# Patient Record
Sex: MALE | Race: WHITE | HISPANIC OR LATINO | URBAN - METROPOLITAN AREA
[De-identification: names, ages, dates, MRNs, and addresses within clinical notes are randomized per-mention and may not be internally consistent; named-entity substitution may affect disease eponyms.]

---

## 2021-04-15 ENCOUNTER — EMERGENCY (EMERGENCY)
Facility: HOSPITAL | Age: 46
LOS: 1 days | Discharge: HOME | End: 2021-04-15
Attending: INTERNAL MEDICINE | Admitting: INTERNAL MEDICINE
Payer: MEDICAID

## 2021-04-15 VITALS
RESPIRATION RATE: 26 BRPM | HEIGHT: 68 IN | HEART RATE: 132 BPM | TEMPERATURE: 103 F | OXYGEN SATURATION: 92 % | WEIGHT: 199.96 LBS | DIASTOLIC BLOOD PRESSURE: 82 MMHG | SYSTOLIC BLOOD PRESSURE: 130 MMHG

## 2021-04-15 VITALS
DIASTOLIC BLOOD PRESSURE: 70 MMHG | HEART RATE: 80 BPM | OXYGEN SATURATION: 95 % | SYSTOLIC BLOOD PRESSURE: 115 MMHG | RESPIRATION RATE: 20 BRPM

## 2021-04-15 LAB
ALBUMIN SERPL ELPH-MCNC: 3.5 G/DL — SIGNIFICANT CHANGE UP (ref 3.3–5)
ALP SERPL-CCNC: 74 U/L — SIGNIFICANT CHANGE UP (ref 40–120)
ALT FLD-CCNC: 71 U/L — HIGH (ref 10–45)
ANION GAP SERPL CALC-SCNC: 11 MMOL/L — SIGNIFICANT CHANGE UP (ref 5–17)
APPEARANCE UR: CLEAR — SIGNIFICANT CHANGE UP
APTT BLD: 31.8 SEC — SIGNIFICANT CHANGE UP (ref 27.5–35.5)
AST SERPL-CCNC: 49 U/L — HIGH (ref 10–40)
BACTERIA # UR AUTO: ABNORMAL /HPF
BASOPHILS # BLD AUTO: 0.01 K/UL — SIGNIFICANT CHANGE UP (ref 0–0.2)
BASOPHILS NFR BLD AUTO: 0.2 % — SIGNIFICANT CHANGE UP (ref 0–2)
BILIRUB SERPL-MCNC: 0.5 MG/DL — SIGNIFICANT CHANGE UP (ref 0.2–1.2)
BILIRUB UR-MCNC: NEGATIVE — SIGNIFICANT CHANGE UP
BUN SERPL-MCNC: 12 MG/DL — SIGNIFICANT CHANGE UP (ref 7–23)
CALCIUM SERPL-MCNC: 8.9 MG/DL — SIGNIFICANT CHANGE UP (ref 8.4–10.5)
CHLORIDE SERPL-SCNC: 99 MMOL/L — SIGNIFICANT CHANGE UP (ref 96–108)
CO2 SERPL-SCNC: 25 MMOL/L — SIGNIFICANT CHANGE UP (ref 22–31)
COLOR SPEC: YELLOW — SIGNIFICANT CHANGE UP
CREAT SERPL-MCNC: 1.19 MG/DL — SIGNIFICANT CHANGE UP (ref 0.5–1.3)
CRP SERPL-MCNC: 57 MG/L — HIGH
DIFF PNL FLD: ABNORMAL
EOSINOPHIL # BLD AUTO: 0 K/UL — SIGNIFICANT CHANGE UP (ref 0–0.5)
EOSINOPHIL NFR BLD AUTO: 0 % — SIGNIFICANT CHANGE UP (ref 0–6)
EPI CELLS # UR: SIGNIFICANT CHANGE UP
FERRITIN SERPL-MCNC: 1217 NG/ML — HIGH (ref 30–400)
GLUCOSE SERPL-MCNC: 150 MG/DL — HIGH (ref 70–99)
GLUCOSE UR QL: NEGATIVE — SIGNIFICANT CHANGE UP
HCT VFR BLD CALC: 46.9 % — SIGNIFICANT CHANGE UP (ref 39–50)
HGB BLD-MCNC: 16.5 G/DL — SIGNIFICANT CHANGE UP (ref 13–17)
IMM GRANULOCYTES NFR BLD AUTO: 0.3 % — SIGNIFICANT CHANGE UP (ref 0–1.5)
INR BLD: 1.24 RATIO — HIGH (ref 0.88–1.16)
KETONES UR-MCNC: NEGATIVE — SIGNIFICANT CHANGE UP
LACTATE SERPL-SCNC: 1 MMOL/L — SIGNIFICANT CHANGE UP (ref 0.7–2)
LEUKOCYTE ESTERASE UR-ACNC: NEGATIVE — SIGNIFICANT CHANGE UP
LYMPHOCYTES # BLD AUTO: 0.8 K/UL — LOW (ref 1–3.3)
LYMPHOCYTES # BLD AUTO: 13.5 % — SIGNIFICANT CHANGE UP (ref 13–44)
MCHC RBC-ENTMCNC: 29.9 PG — SIGNIFICANT CHANGE UP (ref 27–34)
MCHC RBC-ENTMCNC: 35.2 GM/DL — SIGNIFICANT CHANGE UP (ref 32–36)
MCV RBC AUTO: 85.1 FL — SIGNIFICANT CHANGE UP (ref 80–100)
MONOCYTES # BLD AUTO: 0.35 K/UL — SIGNIFICANT CHANGE UP (ref 0–0.9)
MONOCYTES NFR BLD AUTO: 5.9 % — SIGNIFICANT CHANGE UP (ref 2–14)
NEUTROPHILS # BLD AUTO: 4.74 K/UL — SIGNIFICANT CHANGE UP (ref 1.8–7.4)
NEUTROPHILS NFR BLD AUTO: 80.1 % — HIGH (ref 43–77)
NITRITE UR-MCNC: NEGATIVE — SIGNIFICANT CHANGE UP
NRBC # BLD: 0 /100 WBCS — SIGNIFICANT CHANGE UP (ref 0–0)
NT-PROBNP SERPL-SCNC: 72 PG/ML — SIGNIFICANT CHANGE UP (ref 0–300)
PH UR: 6 — SIGNIFICANT CHANGE UP (ref 5–8)
PLATELET # BLD AUTO: 173 K/UL — SIGNIFICANT CHANGE UP (ref 150–400)
POTASSIUM SERPL-MCNC: 4.1 MMOL/L — SIGNIFICANT CHANGE UP (ref 3.5–5.3)
POTASSIUM SERPL-SCNC: 4.1 MMOL/L — SIGNIFICANT CHANGE UP (ref 3.5–5.3)
PROCALCITONIN SERPL-MCNC: 0.13 NG/ML — HIGH
PROT SERPL-MCNC: 7.9 G/DL — SIGNIFICANT CHANGE UP (ref 6–8.3)
PROT UR-MCNC: 15
PROTHROM AB SERPL-ACNC: 14.9 SEC — HIGH (ref 10.6–13.6)
RBC # BLD: 5.51 M/UL — SIGNIFICANT CHANGE UP (ref 4.2–5.8)
RBC # FLD: 12.2 % — SIGNIFICANT CHANGE UP (ref 10.3–14.5)
RBC CASTS # UR COMP ASSIST: SIGNIFICANT CHANGE UP /HPF (ref 0–4)
SARS-COV-2 RNA SPEC QL NAA+PROBE: DETECTED
SODIUM SERPL-SCNC: 135 MMOL/L — SIGNIFICANT CHANGE UP (ref 135–145)
SP GR SPEC: 1.02 — SIGNIFICANT CHANGE UP (ref 1.01–1.02)
TROPONIN I SERPL-MCNC: <.017 NG/ML — LOW (ref 0.02–0.06)
UROBILINOGEN FLD QL: NEGATIVE — SIGNIFICANT CHANGE UP
WBC # BLD: 5.92 K/UL — SIGNIFICANT CHANGE UP (ref 3.8–10.5)
WBC # FLD AUTO: 5.92 K/UL — SIGNIFICANT CHANGE UP (ref 3.8–10.5)
WBC UR QL: SIGNIFICANT CHANGE UP /HPF (ref 0–5)

## 2021-04-15 PROCEDURE — 74177 CT ABD & PELVIS W/CONTRAST: CPT | Mod: 26,MA

## 2021-04-15 PROCEDURE — 87635 SARS-COV-2 COVID-19 AMP PRB: CPT

## 2021-04-15 PROCEDURE — 82728 ASSAY OF FERRITIN: CPT

## 2021-04-15 PROCEDURE — 81001 URINALYSIS AUTO W/SCOPE: CPT

## 2021-04-15 PROCEDURE — 84145 PROCALCITONIN (PCT): CPT

## 2021-04-15 PROCEDURE — 99284 EMERGENCY DEPT VISIT MOD MDM: CPT | Mod: 25

## 2021-04-15 PROCEDURE — 84484 ASSAY OF TROPONIN QUANT: CPT

## 2021-04-15 PROCEDURE — 74177 CT ABD & PELVIS W/CONTRAST: CPT

## 2021-04-15 PROCEDURE — 85730 THROMBOPLASTIN TIME PARTIAL: CPT

## 2021-04-15 PROCEDURE — 93005 ELECTROCARDIOGRAM TRACING: CPT

## 2021-04-15 PROCEDURE — 83880 ASSAY OF NATRIURETIC PEPTIDE: CPT

## 2021-04-15 PROCEDURE — 87086 URINE CULTURE/COLONY COUNT: CPT

## 2021-04-15 PROCEDURE — 94640 AIRWAY INHALATION TREATMENT: CPT

## 2021-04-15 PROCEDURE — 85025 COMPLETE CBC W/AUTO DIFF WBC: CPT

## 2021-04-15 PROCEDURE — 96365 THER/PROPH/DIAG IV INF INIT: CPT | Mod: XU

## 2021-04-15 PROCEDURE — 96367 TX/PROPH/DG ADDL SEQ IV INF: CPT

## 2021-04-15 PROCEDURE — 87040 BLOOD CULTURE FOR BACTERIA: CPT

## 2021-04-15 PROCEDURE — 85610 PROTHROMBIN TIME: CPT

## 2021-04-15 PROCEDURE — 83605 ASSAY OF LACTIC ACID: CPT

## 2021-04-15 PROCEDURE — 80053 COMPREHEN METABOLIC PANEL: CPT

## 2021-04-15 PROCEDURE — 36415 COLL VENOUS BLD VENIPUNCTURE: CPT

## 2021-04-15 PROCEDURE — 71045 X-RAY EXAM CHEST 1 VIEW: CPT

## 2021-04-15 PROCEDURE — 99285 EMERGENCY DEPT VISIT HI MDM: CPT

## 2021-04-15 PROCEDURE — 71045 X-RAY EXAM CHEST 1 VIEW: CPT | Mod: 26

## 2021-04-15 PROCEDURE — 93010 ELECTROCARDIOGRAM REPORT: CPT

## 2021-04-15 PROCEDURE — 96375 TX/PRO/DX INJ NEW DRUG ADDON: CPT

## 2021-04-15 PROCEDURE — 86140 C-REACTIVE PROTEIN: CPT

## 2021-04-15 RX ORDER — IPRATROPIUM BROMIDE 0.2 MG/ML
1 SOLUTION, NON-ORAL INHALATION ONCE
Refills: 0 | Status: COMPLETED | OUTPATIENT
Start: 2021-04-15 | End: 2021-04-15

## 2021-04-15 RX ORDER — KETOROLAC TROMETHAMINE 30 MG/ML
30 SYRINGE (ML) INJECTION ONCE
Refills: 0 | Status: DISCONTINUED | OUTPATIENT
Start: 2021-04-15 | End: 2021-04-15

## 2021-04-15 RX ORDER — ACETAMINOPHEN 500 MG
1 TABLET ORAL
Qty: 30 | Refills: 0
Start: 2021-04-15 | End: 2021-04-24

## 2021-04-15 RX ORDER — DEXAMETHASONE 0.5 MG/5ML
6 ELIXIR ORAL ONCE
Refills: 0 | Status: COMPLETED | OUTPATIENT
Start: 2021-04-15 | End: 2021-04-15

## 2021-04-15 RX ORDER — AZITHROMYCIN 500 MG/1
500 TABLET, FILM COATED ORAL ONCE
Refills: 0 | Status: COMPLETED | OUTPATIENT
Start: 2021-04-15 | End: 2021-04-15

## 2021-04-15 RX ORDER — GUAIFENESIN/DEXTROMETHORPHAN 600MG-30MG
10 TABLET, EXTENDED RELEASE 12 HR ORAL ONCE
Refills: 0 | Status: COMPLETED | OUTPATIENT
Start: 2021-04-15 | End: 2021-04-15

## 2021-04-15 RX ORDER — SODIUM CHLORIDE 9 MG/ML
1000 INJECTION INTRAMUSCULAR; INTRAVENOUS; SUBCUTANEOUS ONCE
Refills: 0 | Status: COMPLETED | OUTPATIENT
Start: 2021-04-15 | End: 2021-04-15

## 2021-04-15 RX ORDER — ALBUTEROL 90 UG/1
2 AEROSOL, METERED ORAL
Qty: 1 | Refills: 0
Start: 2021-04-15 | End: 2021-05-14

## 2021-04-15 RX ORDER — AZITHROMYCIN 500 MG/1
1 TABLET, FILM COATED ORAL
Qty: 4 | Refills: 0
Start: 2021-04-15 | End: 2021-04-18

## 2021-04-15 RX ORDER — ACETAMINOPHEN 500 MG
975 TABLET ORAL ONCE
Refills: 0 | Status: COMPLETED | OUTPATIENT
Start: 2021-04-15 | End: 2021-04-15

## 2021-04-15 RX ORDER — ALBUTEROL 90 UG/1
2 AEROSOL, METERED ORAL ONCE
Refills: 0 | Status: COMPLETED | OUTPATIENT
Start: 2021-04-15 | End: 2021-04-15

## 2021-04-15 RX ORDER — CEFTRIAXONE 500 MG/1
1000 INJECTION, POWDER, FOR SOLUTION INTRAMUSCULAR; INTRAVENOUS ONCE
Refills: 0 | Status: COMPLETED | OUTPATIENT
Start: 2021-04-15 | End: 2021-04-15

## 2021-04-15 RX ADMIN — ALBUTEROL 2 PUFF(S): 90 AEROSOL, METERED ORAL at 08:58

## 2021-04-15 RX ADMIN — Medication 1 PUFF(S): at 08:58

## 2021-04-15 RX ADMIN — Medication 30 MILLIGRAM(S): at 13:00

## 2021-04-15 RX ADMIN — CEFTRIAXONE 100 MILLIGRAM(S): 500 INJECTION, POWDER, FOR SOLUTION INTRAMUSCULAR; INTRAVENOUS at 08:49

## 2021-04-15 RX ADMIN — AZITHROMYCIN 255 MILLIGRAM(S): 500 TABLET, FILM COATED ORAL at 09:20

## 2021-04-15 RX ADMIN — SODIUM CHLORIDE 1000 MILLILITER(S): 9 INJECTION INTRAMUSCULAR; INTRAVENOUS; SUBCUTANEOUS at 08:40

## 2021-04-15 RX ADMIN — Medication 30 MILLIGRAM(S): at 11:31

## 2021-04-15 RX ADMIN — Medication 6 MILLIGRAM(S): at 15:02

## 2021-04-15 RX ADMIN — Medication 975 MILLIGRAM(S): at 08:49

## 2021-04-15 RX ADMIN — Medication 10 MILLILITER(S): at 08:49

## 2021-04-15 RX ADMIN — CEFTRIAXONE 1000 MILLIGRAM(S): 500 INJECTION, POWDER, FOR SOLUTION INTRAMUSCULAR; INTRAVENOUS at 09:20

## 2021-04-15 RX ADMIN — Medication 975 MILLIGRAM(S): at 10:10

## 2021-04-15 RX ADMIN — AZITHROMYCIN 500 MILLIGRAM(S): 500 TABLET, FILM COATED ORAL at 10:09

## 2021-04-15 RX ADMIN — SODIUM CHLORIDE 1000 MILLILITER(S): 9 INJECTION INTRAMUSCULAR; INTRAVENOUS; SUBCUTANEOUS at 10:00

## 2021-04-15 NOTE — ED PROVIDER NOTE - OBJECTIVE STATEMENT
cough fever 4 days was tested positive for covid 1 week ago   onset gradual   locations general   duration 4 days   characteristics fever, cough , body aches headache , chest pain when he coughs   context trsted positive for covid 1 week ago   aggravating factors none  relieving factors otc meds  timming off and on   severity moderate

## 2021-04-15 NOTE — ED ADULT NURSE NOTE - NSIMPLEMENTINTERV_GEN_ALL_ED
Implemented All Universal Safety Interventions:  [place] to call system. Call bell, personal items and telephone within reach. Instruct patient to call for assistance. Room bathroom lighting operational. Non-slip footwear when patient is off stretcher. Physically safe environment: no spills, clutter or unnecessary equipment. Stretcher in lowest position, wheels locked, appropriate side rails in place.

## 2021-04-15 NOTE — ED PROVIDER NOTE - CLINICAL SUMMARY MEDICAL DECISION MAKING FREE TEXT BOX
cough fever 4 days was tested positive for covid 1 week ago   onset gradual   locations general   duration 4 days   characteristics fever, cough , body aches headache , chest pain when he coughs   context trsted positive for covid 1 week ago   aggravating factors none  relieving factors otc meds  timming off and on   severity moderate  cxr bilateral infiltrates  ekg sinus tach labs procalcitonin elevated   walking o2 sat os 92 %

## 2021-04-15 NOTE — ED ADULT TRIAGE NOTE - TEMPERATURE IN FAHRENHEIT (DEGREES F)
[General Appearance - Alert] : alert [General Appearance - In No Acute Distress] : in no acute distress [Sclera] : the sclera and conjunctiva were normal [PERRL With Normal Accommodation] : pupils were equal in size, round, and reactive to light [Extraocular Movements] : extraocular movements were intact [Neck Appearance] : the appearance of the neck was normal [Neck Cervical Mass (___cm)] : no neck mass was observed [Jugular Venous Distention Increased] : there was no jugular-venous distention [Thyroid Diffuse Enlargement] : the thyroid was not enlarged [Thyroid Nodule] : there were no palpable thyroid nodules [Auscultation Breath Sounds / Voice Sounds] : lungs were clear to auscultation bilaterally [Heart Rate And Rhythm] : heart rate was normal and rhythm regular [Heart Sounds] : normal S1 and S2 [Heart Sounds Gallop] : no gallops [Heart Sounds Pericardial Friction Rub] : no pericardial rub [Bowel Sounds] : normal bowel sounds [Abdomen Soft] : soft [Abdomen Tenderness] : non-tender [Abdomen Mass (___ Cm)] : no abdominal mass palpated [Cervical Lymph Nodes Enlarged Posterior Bilaterally] : posterior cervical [Cervical Lymph Nodes Enlarged Anterior Bilaterally] : anterior cervical [Supraclavicular Lymph Nodes Enlarged Bilaterally] : supraclavicular [Axillary Lymph Nodes Enlarged Bilaterally] : axillary [Femoral Lymph Nodes Enlarged Bilaterally] : femoral 102.8 [Inguinal Lymph Nodes Enlarged Bilaterally] : inguinal [Abnormal Walk] : normal gait [Nail Clubbing] : no clubbing  or cyanosis of the fingernails [Musculoskeletal - Swelling] : no joint swelling seen [Motor Tone] : muscle strength and tone were normal [Skin Color & Pigmentation] : normal skin color and pigmentation [Skin Turgor] : normal skin turgor [] : no rash [Oriented To Time, Place, And Person] : oriented to person, place, and time [Impaired Insight] : insight and judgment were intact [Affect] : the affect was normal [FreeTextEntry1] : systolic murmur

## 2021-04-15 NOTE — ED ADULT NURSE NOTE - OBJECTIVE STATEMENT
tested positive 1+ week ago, started with fever and cough for 3 days and increased shortness of breath. Skine warm dry color pink, no edema, bilateral breath sounds, cough. Abdomen soft bowel sounds present, tender on left quadrants, no diarrhea

## 2021-04-15 NOTE — ED PROVIDER NOTE - PATIENT PORTAL LINK FT
You can access the FollowMyHealth Patient Portal offered by [facility] by registering at the following website: [email or web address]. By joining [facility]’s FollowMyHealth portal, you will also be able to view your health information using other applications (apps) compatible with our system.

## 2021-04-16 ENCOUNTER — INPATIENT (INPATIENT)
Facility: HOSPITAL | Age: 46
LOS: 7 days | Discharge: HOME | DRG: 177 | End: 2021-04-24
Attending: INTERNAL MEDICINE | Admitting: INTERNAL MEDICINE
Payer: MEDICAID

## 2021-04-16 ENCOUNTER — TRANSCRIPTION ENCOUNTER (OUTPATIENT)
Age: 46
End: 2021-04-16

## 2021-04-16 VITALS
DIASTOLIC BLOOD PRESSURE: 84 MMHG | HEART RATE: 97 BPM | RESPIRATION RATE: 16 BRPM | OXYGEN SATURATION: 84 % | SYSTOLIC BLOOD PRESSURE: 127 MMHG | TEMPERATURE: 97 F | HEIGHT: 68 IN | WEIGHT: 190.04 LBS

## 2021-04-16 DIAGNOSIS — U07.1 COVID-19: ICD-10-CM

## 2021-04-16 LAB
ALBUMIN SERPL ELPH-MCNC: 3.1 G/DL — LOW (ref 3.3–5)
ALP SERPL-CCNC: 73 U/L — SIGNIFICANT CHANGE UP (ref 40–120)
ALT FLD-CCNC: 83 U/L — HIGH (ref 10–45)
ANION GAP SERPL CALC-SCNC: 5 MMOL/L — SIGNIFICANT CHANGE UP (ref 5–17)
APTT BLD: 28.2 SEC — SIGNIFICANT CHANGE UP (ref 27.5–35.5)
AST SERPL-CCNC: 53 U/L — HIGH (ref 10–40)
BASOPHILS # BLD AUTO: 0.01 K/UL — SIGNIFICANT CHANGE UP (ref 0–0.2)
BASOPHILS NFR BLD AUTO: 0.1 % — SIGNIFICANT CHANGE UP (ref 0–2)
BILIRUB SERPL-MCNC: 0.6 MG/DL — SIGNIFICANT CHANGE UP (ref 0.2–1.2)
BUN SERPL-MCNC: 12 MG/DL — SIGNIFICANT CHANGE UP (ref 7–23)
CALCIUM SERPL-MCNC: 9 MG/DL — SIGNIFICANT CHANGE UP (ref 8.4–10.5)
CHLORIDE SERPL-SCNC: 98 MMOL/L — SIGNIFICANT CHANGE UP (ref 96–108)
CO2 SERPL-SCNC: 29 MMOL/L — SIGNIFICANT CHANGE UP (ref 22–31)
CREAT SERPL-MCNC: 1.09 MG/DL — SIGNIFICANT CHANGE UP (ref 0.5–1.3)
CULTURE RESULTS: SIGNIFICANT CHANGE UP
D DIMER BLD IA.RAPID-MCNC: 412 NG/ML DDU — HIGH
EOSINOPHIL # BLD AUTO: 0 K/UL — SIGNIFICANT CHANGE UP (ref 0–0.5)
EOSINOPHIL NFR BLD AUTO: 0 % — SIGNIFICANT CHANGE UP (ref 0–6)
GLUCOSE SERPL-MCNC: 130 MG/DL — HIGH (ref 70–99)
HCT VFR BLD CALC: 44.6 % — SIGNIFICANT CHANGE UP (ref 39–50)
HGB BLD-MCNC: 15.6 G/DL — SIGNIFICANT CHANGE UP (ref 13–17)
IMM GRANULOCYTES NFR BLD AUTO: 0.3 % — SIGNIFICANT CHANGE UP (ref 0–1.5)
INR BLD: 1.23 RATIO — HIGH (ref 0.88–1.16)
LACTATE SERPL-SCNC: 1.5 MMOL/L — SIGNIFICANT CHANGE UP (ref 0.7–2)
LYMPHOCYTES # BLD AUTO: 1.31 K/UL — SIGNIFICANT CHANGE UP (ref 1–3.3)
LYMPHOCYTES # BLD AUTO: 19.2 % — SIGNIFICANT CHANGE UP (ref 13–44)
MCHC RBC-ENTMCNC: 29.9 PG — SIGNIFICANT CHANGE UP (ref 27–34)
MCHC RBC-ENTMCNC: 35 GM/DL — SIGNIFICANT CHANGE UP (ref 32–36)
MCV RBC AUTO: 85.4 FL — SIGNIFICANT CHANGE UP (ref 80–100)
MONOCYTES # BLD AUTO: 0.18 K/UL — SIGNIFICANT CHANGE UP (ref 0–0.9)
MONOCYTES NFR BLD AUTO: 2.6 % — SIGNIFICANT CHANGE UP (ref 2–14)
NEUTROPHILS # BLD AUTO: 5.32 K/UL — SIGNIFICANT CHANGE UP (ref 1.8–7.4)
NEUTROPHILS NFR BLD AUTO: 77.8 % — HIGH (ref 43–77)
NRBC # BLD: 0 /100 WBCS — SIGNIFICANT CHANGE UP (ref 0–0)
PLATELET # BLD AUTO: 166 K/UL — SIGNIFICANT CHANGE UP (ref 150–400)
POTASSIUM SERPL-MCNC: 3.9 MMOL/L — SIGNIFICANT CHANGE UP (ref 3.5–5.3)
POTASSIUM SERPL-SCNC: 3.9 MMOL/L — SIGNIFICANT CHANGE UP (ref 3.5–5.3)
PROCALCITONIN SERPL-MCNC: 0.12 NG/ML — HIGH
PROT SERPL-MCNC: 7.3 G/DL — SIGNIFICANT CHANGE UP (ref 6–8.3)
PROTHROM AB SERPL-ACNC: 14.7 SEC — HIGH (ref 10.6–13.6)
RBC # BLD: 5.22 M/UL — SIGNIFICANT CHANGE UP (ref 4.2–5.8)
RBC # FLD: 12.3 % — SIGNIFICANT CHANGE UP (ref 10.3–14.5)
SODIUM SERPL-SCNC: 132 MMOL/L — LOW (ref 135–145)
SPECIMEN SOURCE: SIGNIFICANT CHANGE UP
WBC # BLD: 6.84 K/UL — SIGNIFICANT CHANGE UP (ref 3.8–10.5)
WBC # FLD AUTO: 6.84 K/UL — SIGNIFICANT CHANGE UP (ref 3.8–10.5)

## 2021-04-16 PROCEDURE — 93010 ELECTROCARDIOGRAM REPORT: CPT

## 2021-04-16 PROCEDURE — 99285 EMERGENCY DEPT VISIT HI MDM: CPT

## 2021-04-16 PROCEDURE — 99223 1ST HOSP IP/OBS HIGH 75: CPT

## 2021-04-16 PROCEDURE — 71045 X-RAY EXAM CHEST 1 VIEW: CPT | Mod: 26

## 2021-04-16 RX ORDER — FAMOTIDINE 10 MG/ML
20 INJECTION INTRAVENOUS ONCE
Refills: 0 | Status: COMPLETED | OUTPATIENT
Start: 2021-04-16 | End: 2021-04-16

## 2021-04-16 RX ORDER — REMDESIVIR 5 MG/ML
INJECTION INTRAVENOUS
Refills: 0 | Status: COMPLETED | OUTPATIENT
Start: 2021-04-16 | End: 2021-04-20

## 2021-04-16 RX ORDER — CHOLECALCIFEROL (VITAMIN D3) 125 MCG
2000 CAPSULE ORAL DAILY
Refills: 0 | Status: DISCONTINUED | OUTPATIENT
Start: 2021-04-16 | End: 2021-04-18

## 2021-04-16 RX ORDER — ENOXAPARIN SODIUM 100 MG/ML
40 INJECTION SUBCUTANEOUS DAILY
Refills: 0 | Status: DISCONTINUED | OUTPATIENT
Start: 2021-04-16 | End: 2021-04-18

## 2021-04-16 RX ORDER — ASCORBIC ACID 60 MG
500 TABLET,CHEWABLE ORAL
Refills: 0 | Status: DISCONTINUED | OUTPATIENT
Start: 2021-04-16 | End: 2021-04-18

## 2021-04-16 RX ORDER — REMDESIVIR 5 MG/ML
200 INJECTION INTRAVENOUS EVERY 24 HOURS
Refills: 0 | Status: COMPLETED | OUTPATIENT
Start: 2021-04-16 | End: 2021-04-16

## 2021-04-16 RX ORDER — ACETAMINOPHEN 500 MG
650 TABLET ORAL EVERY 6 HOURS
Refills: 0 | Status: DISCONTINUED | OUTPATIENT
Start: 2021-04-16 | End: 2021-04-24

## 2021-04-16 RX ORDER — REMDESIVIR 5 MG/ML
100 INJECTION INTRAVENOUS EVERY 24 HOURS
Refills: 0 | Status: COMPLETED | OUTPATIENT
Start: 2021-04-17 | End: 2021-04-20

## 2021-04-16 RX ORDER — FAMOTIDINE 10 MG/ML
20 INJECTION INTRAVENOUS DAILY
Refills: 0 | Status: DISCONTINUED | OUTPATIENT
Start: 2021-04-16 | End: 2021-04-24

## 2021-04-16 RX ORDER — ONDANSETRON 8 MG/1
4 TABLET, FILM COATED ORAL EVERY 8 HOURS
Refills: 0 | Status: DISCONTINUED | OUTPATIENT
Start: 2021-04-16 | End: 2021-04-24

## 2021-04-16 RX ORDER — ZINC SULFATE TAB 220 MG (50 MG ZINC EQUIVALENT) 220 (50 ZN) MG
220 TAB ORAL DAILY
Refills: 0 | Status: DISCONTINUED | OUTPATIENT
Start: 2021-04-16 | End: 2021-04-24

## 2021-04-16 RX ORDER — ALBUTEROL 90 UG/1
2 AEROSOL, METERED ORAL EVERY 6 HOURS
Refills: 0 | Status: DISCONTINUED | OUTPATIENT
Start: 2021-04-16 | End: 2021-04-24

## 2021-04-16 RX ORDER — DEXAMETHASONE 0.5 MG/5ML
6 ELIXIR ORAL DAILY
Refills: 0 | Status: DISCONTINUED | OUTPATIENT
Start: 2021-04-16 | End: 2021-04-24

## 2021-04-16 RX ORDER — FAMOTIDINE 10 MG/ML
20 INJECTION INTRAVENOUS ONCE
Refills: 0 | Status: DISCONTINUED | OUTPATIENT
Start: 2021-04-16 | End: 2021-04-16

## 2021-04-16 NOTE — ED PROVIDER NOTE - OBJECTIVE STATEMENT
pt c/o difficult breathing, moderate, today, worse with exertion, assoc c low SpO2, 83% at home today. pt had home covid exposure, got tested 8 day ago, covid +. started getting sxs 4 d ago with fever, chills, coughing, bodyaches.

## 2021-04-16 NOTE — ED PROVIDER NOTE - CLINICAL SUMMARY MEDICAL DECISION MAKING FREE TEXT BOX
46 yo M c COVID sxs x 4 days with sob, CASTELLANOS, hypoxia, improved on supplemental NC o2. c/w covid pna.  will need admission for further care, o2.

## 2021-04-16 NOTE — H&P ADULT - NSHPREVIEWOFSYSTEMS_GEN_ALL_CORE
CONSTITUTIONAL: ++ fever, weight loss, ++ fatigue  EYES: No eye pain, visual disturbances, or discharge  ENMT:  No difficulty hearing, tinnitus, vertigo; No sinus or throat pain  NECK: No pain or stiffness  RESPIRATORY: ++ cough, no wheezing, chills or hemoptysis; ++shortness of breath  CARDIOVASCULAR: No chest pain, palpitations, dizziness, or leg swelling  GASTROINTESTINAL: + abdominal  pain. No nausea, vomiting, or hematemesis; No diarrhea or constipation. No melena or hematochezia.  GENITOURINARY: No dysuria, frequency, hematuria, or incontinence  NEUROLOGICAL: No headaches, memory loss, loss of strength, numbness, or tremors  SKIN: No itching, burning, rashes, or lesions   LYMPH NODES: No enlarged glands  ENDOCRINE: No heat or cold intolerance; No hair loss  MUSCULOSKELETAL: No joint pain or swelling; No muscle, back, or extremity pain  PSYCHIATRIC: No depression, anxiety, mood swings, or difficulty sleeping  HEME/LYMPH: No easy bruising, or bleeding gums  ALLERY AND IMMUNOLOGIC: No hives or eczema    IMPROVE VTE Individual Risk Assessment          RISK                                                          Points  [  ] Previous VTE                                                3  [  ] Thrombophilia                                             2  [  ] Lower limb paralysis                                   2        (unable to hold up >15 seconds)    [  ] Current Cancer                                             2         (within 6 months)  [  ] Immobilization > 24 hrs                              1  [  ] ICU/CCU stay > 24 hours                             1  [  ] Age > 60                                                         1    IMPROVE VTE Score:         [   0      ]    Total Risk Factor Score:    0 - 1:   Consider IPC  >2 - 3:  Thromboprophylaxis required (enoxaparin or SQ heparin)        >4:   High Risk: Thromboprophylaxis required (enoxaparin or SQ heparin), optional add IPC  **If CONTRAINDICATION to enoxaparin or SQ heparin, USE IPCs**

## 2021-04-16 NOTE — H&P ADULT - ASSESSMENT
45M no sigPMHx other than HLD not on meds pw acute respiratory failure with hypoxia sec COVID 19 pna.    #COVID 19 pna with hypoxia  cont supplemental oxygen.   continuous pulse ox  proning as tolerated.   Start IV Decadron and IV Remdesivir.   Alb inhalers  elevated ferritin to 1217 and elev CRP 57. follow inflammatory markers.   DVT proph  GI proph with pepcid.

## 2021-04-16 NOTE — H&P ADULT - HISTORY OF PRESENT ILLNESS
45M no sigPMHx other than HLD not on meds pw COVID 19 pna. Pt tested positive for COVID 4/8/21 after several family members came down with COVID. Became symptomatic about 4 days ago with fevers, chills, myalgia, abd pain, cough and SOB. Denied vomiting, diarrhea, chest pain, leg swelling, loss of taste or smell. Came to ED yesterday for evaluation and had CTAP neg for abd pathology but +bl GGO cw COVID. Pt was D/C on azithromycin, Albuterol inhaler with no improvement and came to ED for decreased sat at home to 83%.  In ED afebrile P:97 BP: 127/84 sat 84% on RA now sat 96% on 2L NC.     Son Kunal Dubose at bedside [phone]

## 2021-04-16 NOTE — H&P ADULT - NSHPPHYSICALEXAM_GEN_ALL_CORE
Vital Signs Last 24 Hrs  T(C): 36.2 (16 Apr 2021 20:46), Max: 36.2 (16 Apr 2021 20:46)  T(F): 97.2 (16 Apr 2021 20:46), Max: 97.2 (16 Apr 2021 20:46)  HR: 90 (16 Apr 2021 23:08) (90 - 97)  BP: 117/77 (16 Apr 2021 23:08) (116/74 - 127/84)  BP(mean): 87 (16 Apr 2021 23:08) (83 - 87)  RR: 17 (16 Apr 2021 23:08) (16 - 17)  SpO2: 95% (16 Apr 2021 23:08) (84% - 99%)  Daily Height in cm: 172.72 (16 Apr 2021 20:46)    Daily   CAPILLARY BLOOD GLUCOSE        I&O's Summary      GENERAL: NAD, fatigued  HEAD:  Normocephalic  EYES: EOMI, PERRLA, conjunctiva and sclera clear  ENMT: No tonsillar erythema, exudates, or enlargement; Moist mucous membranes, No lesions  NECK: Supple, No JVD, no bruit, normal thyroid  NERVOUS SYSTEM:  Alert & Oriented X3, grossly moves all fours  CHEST/LUNG: natalia diffuse crackles. no rhonchi, wheezing, or rubs  HEART: Regular rate and rhythm; No murmurs, rubs, or gallops  ABDOMEN: Soft, mild diffuse tenderness on palp no devaughn, no rig, Nondistended; Bowel sounds present  EXTREMITIES:  2+ Peripheral Pulses, No clubbing, cyanosis, or edema  LYMPH: No lymphadenopathy noted  SKIN: No rashes or lesions

## 2021-04-16 NOTE — H&P ADULT - NSHPLABSRESULTS_GEN_ALL_CORE
15.6   6.84  )-----------( 166      ( 2021 21:30 )             44.6           132<L>  |  98  |  12  ----------------------------<  130<H>  3.9   |  29  |  1.09    Ca    9.0      2021 21:30    TPro  7.3  /  Alb  3.1<L>  /  TBili  0.6  /  DBili  x   /  AST  53<H>  /  ALT  83<H>  /  AlkPhos  73      Lactate, Blood: 1.5 mmol/L ( @ 21:30)  Lactate, Blood: 1.0 mmol/L (04-15 @ 08:20)       LIVER FUNCTIONS - ( 2021 21:30 )  Alb: 3.1 g/dL / Pro: 7.3 g/dL / ALK PHOS: 73 U/L / ALT: 83 U/L / AST: 53 U/L / GGT: x               PT/INR - ( 2021 21:30 )   PT: 14.7 sec;   INR: 1.23 ratio         PTT - ( 2021 21:30 )  PTT:28.2 sec    CARDIAC MARKERS ( 15 Apr 2021 08:20 )  <.017 ng/mL / x     / x     / x     / x          Serum Pro-Brain Natriuretic Peptide: 72 pg/mL (04-15-21 @ 08:20)    Urinalysis Basic - ( 15 Apr 2021 10:30 )    Color: Yellow / Appearance: Clear / S.020 / pH: x  Gluc: x / Ketone: Negative  / Bili: Negative / Urobili: Negative   Blood: x / Protein: 15 / Nitrite: Negative   Leuk Esterase: Negative / RBC: 0-4 /HPF / WBC 0-2 /HPF   Sq Epi: x / Non Sq Epi: Neg.-Few / Bacteria: Trace /HPF        CAPILLARY BLOOD GLUCOSE         @ 21:30  412 ng/mL DDU<H>      EKG: nSR at 96bpm, no acute st changes.       CXR: wet read  bl diffuse infiltrates.

## 2021-04-16 NOTE — ED ADULT NURSE NOTE - OBJECTIVE STATEMENT
Patient alert and oriented X4, accompanied by son c/o SOB. Patient tested for COVID last Thur and was positive; sx began 4 days ago per patient. Patient came to hospital yesterday c/o fever of "104 F". Patient was later discharged but came back today c/o increased SOB with exertion, body aches, weakness, and fatigue. Patient son states that patient has pulseox at home and he has been checking O2 saturation every hour, with the lowest saturation being 90% on room air. Patients son felt concerned that it was going to drop further and wanted him evaluated. Upon arrival O2 saturation in triage 84% on RA. Patient denies fever today, chest pain, numbness/tingling, N/V/D, LOC, recent falls, pain, or any other complaints. Last dose of Tylenol per son was yesterday,

## 2021-04-17 PROBLEM — E78.5 HYPERLIPIDEMIA, UNSPECIFIED: Chronic | Status: ACTIVE | Noted: 2021-04-15

## 2021-04-17 LAB
A1C WITH ESTIMATED AVERAGE GLUCOSE RESULT: 6.4 % — HIGH (ref 4–5.6)
ALBUMIN SERPL ELPH-MCNC: 2.8 G/DL — LOW (ref 3.3–5)
ALP SERPL-CCNC: 73 U/L — SIGNIFICANT CHANGE UP (ref 40–120)
ALT FLD-CCNC: 82 U/L — HIGH (ref 10–45)
ANION GAP SERPL CALC-SCNC: 10 MMOL/L — SIGNIFICANT CHANGE UP (ref 5–17)
AST SERPL-CCNC: 53 U/L — HIGH (ref 10–40)
BASOPHILS # BLD AUTO: 0 K/UL — SIGNIFICANT CHANGE UP (ref 0–0.2)
BASOPHILS NFR BLD AUTO: 0 % — SIGNIFICANT CHANGE UP (ref 0–2)
BILIRUB SERPL-MCNC: 0.6 MG/DL — SIGNIFICANT CHANGE UP (ref 0.2–1.2)
BUN SERPL-MCNC: 10 MG/DL — SIGNIFICANT CHANGE UP (ref 7–23)
CALCIUM SERPL-MCNC: 8.7 MG/DL — SIGNIFICANT CHANGE UP (ref 8.4–10.5)
CHLORIDE SERPL-SCNC: 99 MMOL/L — SIGNIFICANT CHANGE UP (ref 96–108)
CO2 SERPL-SCNC: 24 MMOL/L — SIGNIFICANT CHANGE UP (ref 22–31)
COVID-19 SPIKE DOMAIN AB INTERP: NEGATIVE — SIGNIFICANT CHANGE UP
COVID-19 SPIKE DOMAIN ANTIBODY RESULT: 0.4 U/ML — SIGNIFICANT CHANGE UP
CREAT SERPL-MCNC: 1.03 MG/DL — SIGNIFICANT CHANGE UP (ref 0.5–1.3)
CRP SERPL-MCNC: 110 MG/L — HIGH
CRP SERPL-MCNC: 65 MG/L — HIGH
EOSINOPHIL # BLD AUTO: 0 K/UL — SIGNIFICANT CHANGE UP (ref 0–0.5)
EOSINOPHIL NFR BLD AUTO: 0 % — SIGNIFICANT CHANGE UP (ref 0–6)
ESTIMATED AVERAGE GLUCOSE: 137 MG/DL — HIGH (ref 68–114)
FERRITIN SERPL-MCNC: 1455 NG/ML — HIGH (ref 30–400)
FERRITIN SERPL-MCNC: 1486 NG/ML — HIGH (ref 30–400)
GLUCOSE SERPL-MCNC: 194 MG/DL — HIGH (ref 70–99)
HCT VFR BLD CALC: 41.6 % — SIGNIFICANT CHANGE UP (ref 39–50)
HGB BLD-MCNC: 14.9 G/DL — SIGNIFICANT CHANGE UP (ref 13–17)
IMM GRANULOCYTES NFR BLD AUTO: 0.3 % — SIGNIFICANT CHANGE UP (ref 0–1.5)
LDH SERPL L TO P-CCNC: 447 U/L — HIGH (ref 50–242)
LYMPHOCYTES # BLD AUTO: 0.59 K/UL — LOW (ref 1–3.3)
LYMPHOCYTES # BLD AUTO: 8.9 % — LOW (ref 13–44)
MCHC RBC-ENTMCNC: 29.8 PG — SIGNIFICANT CHANGE UP (ref 27–34)
MCHC RBC-ENTMCNC: 35.8 GM/DL — SIGNIFICANT CHANGE UP (ref 32–36)
MCV RBC AUTO: 83.2 FL — SIGNIFICANT CHANGE UP (ref 80–100)
MONOCYTES # BLD AUTO: 0.15 K/UL — SIGNIFICANT CHANGE UP (ref 0–0.9)
MONOCYTES NFR BLD AUTO: 2.3 % — SIGNIFICANT CHANGE UP (ref 2–14)
NEUTROPHILS # BLD AUTO: 5.86 K/UL — SIGNIFICANT CHANGE UP (ref 1.8–7.4)
NEUTROPHILS NFR BLD AUTO: 88.5 % — HIGH (ref 43–77)
NRBC # BLD: 0 /100 WBCS — SIGNIFICANT CHANGE UP (ref 0–0)
NT-PROBNP SERPL-SCNC: 65 PG/ML — SIGNIFICANT CHANGE UP (ref 0–300)
PLATELET # BLD AUTO: 181 K/UL — SIGNIFICANT CHANGE UP (ref 150–400)
POTASSIUM SERPL-MCNC: 4.3 MMOL/L — SIGNIFICANT CHANGE UP (ref 3.5–5.3)
POTASSIUM SERPL-SCNC: 4.3 MMOL/L — SIGNIFICANT CHANGE UP (ref 3.5–5.3)
PROT SERPL-MCNC: 6.9 G/DL — SIGNIFICANT CHANGE UP (ref 6–8.3)
RBC # BLD: 5 M/UL — SIGNIFICANT CHANGE UP (ref 4.2–5.8)
RBC # FLD: 12.3 % — SIGNIFICANT CHANGE UP (ref 10.3–14.5)
SARS-COV-2 IGG+IGM SERPL QL IA: 0.4 U/ML — SIGNIFICANT CHANGE UP
SARS-COV-2 IGG+IGM SERPL QL IA: NEGATIVE — SIGNIFICANT CHANGE UP
SARS-COV-2 RNA SPEC QL NAA+PROBE: DETECTED
SODIUM SERPL-SCNC: 133 MMOL/L — LOW (ref 135–145)
TROPONIN I SERPL-MCNC: <.017 NG/ML — LOW (ref 0.02–0.06)
WBC # BLD: 6.62 K/UL — SIGNIFICANT CHANGE UP (ref 3.8–10.5)
WBC # FLD AUTO: 6.62 K/UL — SIGNIFICANT CHANGE UP (ref 3.8–10.5)

## 2021-04-17 PROCEDURE — 99233 SBSQ HOSP IP/OBS HIGH 50: CPT

## 2021-04-17 RX ADMIN — Medication 6 MILLIGRAM(S): at 00:31

## 2021-04-17 RX ADMIN — Medication 650 MILLIGRAM(S): at 00:31

## 2021-04-17 RX ADMIN — Medication 200 MILLIGRAM(S): at 00:30

## 2021-04-17 RX ADMIN — FAMOTIDINE 100 MILLIGRAM(S): 10 INJECTION INTRAVENOUS at 00:30

## 2021-04-17 RX ADMIN — ALBUTEROL 2 PUFF(S): 90 AEROSOL, METERED ORAL at 09:27

## 2021-04-17 RX ADMIN — Medication 500 MILLIGRAM(S): at 05:43

## 2021-04-17 RX ADMIN — REMDESIVIR 500 MILLIGRAM(S): 5 INJECTION INTRAVENOUS at 22:05

## 2021-04-17 RX ADMIN — Medication 200 MILLIGRAM(S): at 17:07

## 2021-04-17 RX ADMIN — ALBUTEROL 2 PUFF(S): 90 AEROSOL, METERED ORAL at 15:36

## 2021-04-17 RX ADMIN — FAMOTIDINE 20 MILLIGRAM(S): 10 INJECTION INTRAVENOUS at 11:57

## 2021-04-17 RX ADMIN — ZINC SULFATE TAB 220 MG (50 MG ZINC EQUIVALENT) 220 MILLIGRAM(S): 220 (50 ZN) TAB at 11:57

## 2021-04-17 RX ADMIN — Medication 2000 UNIT(S): at 11:57

## 2021-04-17 RX ADMIN — Medication 500 MILLIGRAM(S): at 17:08

## 2021-04-17 RX ADMIN — ENOXAPARIN SODIUM 40 MILLIGRAM(S): 100 INJECTION SUBCUTANEOUS at 11:57

## 2021-04-17 RX ADMIN — ALBUTEROL 2 PUFF(S): 90 AEROSOL, METERED ORAL at 21:20

## 2021-04-17 RX ADMIN — REMDESIVIR 200 MILLIGRAM(S): 5 INJECTION INTRAVENOUS at 00:30

## 2021-04-17 NOTE — PROGRESS NOTE ADULT - ASSESSMENT
45M no sigPMHx other than HLD admitted with acute respiratory failure with hypoxia sec COVID- 19 PNA.    #COVID PNA  #Acute Hypoxic Resp failure  -CT of chest: patchy b/l groundglass infiltrates  -cont supplemental oxygen, on 3L of O2 nc sats 91%, increased to 4L improves to 95%   -continuous pulse ox  -proning as tolerated  -day 2 of IV Decadron and IV Remdesivir   -cont Albuterol INH prn, Zinc, Guaifenesin, Vit D, Vit C  -elevated ferritin to 1217 and elev CRP 57, D-dimer 412-- follow inflammatory markers q72h  -DVT ppx: lovenox    #mild Hyponatremia  -off IVF, continue to monitor Na levels    #mild Transaminitis  -secondary to Covid infection  -cont to monitor LFT's    Dispo:  Kunal Balderas [phone]; updated him on plan of care.

## 2021-04-17 NOTE — PROGRESS NOTE ADULT - ATTENDING COMMENTS
44 y/o M PMH HLD admitted with acute respiratory failure with hypoxia sec COVID- 19 PNA. tmax 102.8F. continue remdesivir, decadron course per protocol. monitor LFTs.  supplemental O2 prn. O2 sat at rest today 94% on 4L NC. oob as tolerated, prone, incentive spirometry. hyponatremia - cont to monitor. d-dimer 412. dvt ppx - lovenox

## 2021-04-17 NOTE — PROGRESS NOTE ADULT - SUBJECTIVE AND OBJECTIVE BOX
Patient is a 45y old  Male who presents with a chief complaint of COVID 19 (2021 23:08)    Patient seen and examined at bedside.  Pt c/o cough, shortness of breath with exertion, and overall fatigue.    ALLERGIES:  No Known Allergies    MEDICATIONS  (STANDING):  ALBUTerol    90 MICROgram(s) HFA Inhaler 2 Puff(s) Inhalation every 6 hours  ascorbic acid 500 milliGRAM(s) Oral two times a day  cholecalciferol 2000 Unit(s) Oral daily  dexAMETHasone  Injectable 6 milliGRAM(s) IV Push daily  enoxaparin Injectable 40 milliGRAM(s) SubCutaneous daily  famotidine    Tablet 20 milliGRAM(s) Oral daily  remdesivir  IVPB   IV Intermittent   remdesivir  IVPB 100 milliGRAM(s) IV Intermittent every 24 hours  zinc sulfate 220 milliGRAM(s) Oral daily    MEDICATIONS  (PRN):  acetaminophen   Tablet .. 650 milliGRAM(s) Oral every 6 hours PRN Temp greater or equal to 38C (100.4F), Mild Pain (1 - 3)  guaiFENesin   Syrup  (Sugar-Free) 200 milliGRAM(s) Oral every 6 hours PRN Cough  ondansetron Injectable 4 milliGRAM(s) IV Push every 8 hours PRN Nausea and/or Vomiting    Vital Signs Last 24 Hrs  T(F): 99.4 (2021 05:29), Max: 102.8 (2021 23:39)  HR: 83 (2021 05:29) (83 - 100)  BP: 128/88 (2021 05:29) (116/74 - 128/88)  RR: 22 (2021 05:29) (16 - 32)  SpO2: 95% (2021 05:29) (84% - 99%)  I&O's Summary    PHYSICAL EXAM:  General: NAD, A/O x 3  ENT: MMM, no thrush  Neck: Supple, No JVD  Lungs: non-labored breathing, +fine crackles, no wheezing  Cardio: RRR, S1/S2, No murmurs  Abdomen: Soft, Nontender, Nondistended; Bowel sounds present  Extremities: No calf tenderness, No pitting edema  Neuro:  alert, nonfocal    LABS:                        14.9   6.62  )-----------( 181      ( 2021 05:50 )             41.6         133  |  99  |  10  ----------------------------<  194  4.3   |  24  |  1.03    Ca    8.7      2021 05:50    TPro  6.9  /  Alb  2.8  /  TBili  0.6  /  DBili  x   /  AST  53  /  ALT  82  /  AlkPhos  73      eGFR if Non African American: 86 mL/min/1.73M2 (21 @ 05:50)  eGFR if African American: 100 mL/min/1.73M2 (21 @ 05:50)    PT/INR - ( 2021 21:30 )   PT: 14.7 sec;   INR: 1.23 ratio         PTT - ( 2021 21:30 )  PTT:28.2 sec  Lactate, Blood: 1.5 mmol/L ( @ 21:30)  Lactate, Blood: 1.0 mmol/L (04-15 @ 08:20)    CARDIAC MARKERS ( 2021 05:10 )  <.017 ng/mL / x     / x     / x     / x      CARDIAC MARKERS ( 15 Apr 2021 08:20 )  <.017 ng/mL / x     / x     / x     / x          Urinalysis Basic - ( 15 Apr 2021 10:30 )    Color: Yellow / Appearance: Clear / S.020 / pH: x  Gluc: x / Ketone: Negative  / Bili: Negative / Urobili: Negative   Blood: x / Protein: 15 / Nitrite: Negative   Leuk Esterase: Negative / RBC: 0-4 /HPF / WBC 0-2 /HPF   Sq Epi: x / Non Sq Epi: Neg.-Few / Bacteria: Trace /HPF        Culture - Urine (collected 15 Apr 2021 10:30)  Source: .Urine Clean Catch (Midstream)  Final Report (2021 11:35):    <10,000 CFU/mL Normal Urogenital Sofiya    Culture - Blood (collected 15 Apr 2021 08:20)  Source: .Blood Blood-Peripheral  Preliminary Report (2021 14:01):    No growth to date.    Culture - Blood (collected 15 Apr 2021 08:20)  Source: .Blood Blood-Peripheral  Preliminary Report (2021 14:01):    No growth to date.        RADIOLOGY & ADDITIONAL TESTS:  < from: CT Abdomen and Pelvis w/ IV Cont (04.15.21 @ 13:45) >    FINDINGS:  LOWER CHEST: Patchy bilateral lower lobe groundglass infiltrates.    LIVER: 5 mm left lobe hypodensity too small to characterize. Right lobe 20.3 cm.  BILE DUCTS: Normal caliber.  GALLBLADDER: Within normal limits.  SPLEEN: Within normal limits.  PANCREAS: Within normal limits.  ADRENALS: Within normal limits.  KIDNEYS/URETERS: 1 cm left upper pole cyst. 5 mm right midpole hypodensity too small to characterize.    BLADDER: Within normal limits.  REPRODUCTIVE ORGANS: Unremarkable prostate.    BOWEL: No bowel obstruction. Appendix is normal. Mild fecal burden throughout the colon.  PERITONEUM: No ascites.  VESSELS: Within normal limits.  RETROPERITONEUM/LYMPH NODES: No lymphadenopathy.  ABDOMINAL WALL: Within normal limits.  BONES: Within normal limits.    IMPRESSION:  Bilateral infiltrates, likely Covid pneumonia.    Mild constipation.    Mild hepatomegaly.    < end of copied text >    Care Discussed with Consultants/Other Providers:

## 2021-04-18 LAB
ANION GAP SERPL CALC-SCNC: 7 MMOL/L — SIGNIFICANT CHANGE UP (ref 5–17)
BUN SERPL-MCNC: 13 MG/DL — SIGNIFICANT CHANGE UP (ref 7–23)
CALCIUM SERPL-MCNC: 9.3 MG/DL — SIGNIFICANT CHANGE UP (ref 8.4–10.5)
CHLORIDE SERPL-SCNC: 101 MMOL/L — SIGNIFICANT CHANGE UP (ref 96–108)
CO2 SERPL-SCNC: 29 MMOL/L — SIGNIFICANT CHANGE UP (ref 22–31)
CREAT SERPL-MCNC: 1.01 MG/DL — SIGNIFICANT CHANGE UP (ref 0.5–1.3)
GLUCOSE SERPL-MCNC: 146 MG/DL — HIGH (ref 70–99)
HCT VFR BLD CALC: 41.4 % — SIGNIFICANT CHANGE UP (ref 39–50)
HGB BLD-MCNC: 14.6 G/DL — SIGNIFICANT CHANGE UP (ref 13–17)
MCHC RBC-ENTMCNC: 29.9 PG — SIGNIFICANT CHANGE UP (ref 27–34)
MCHC RBC-ENTMCNC: 35.3 GM/DL — SIGNIFICANT CHANGE UP (ref 32–36)
MCV RBC AUTO: 84.8 FL — SIGNIFICANT CHANGE UP (ref 80–100)
NRBC # BLD: 0 /100 WBCS — SIGNIFICANT CHANGE UP (ref 0–0)
PLATELET # BLD AUTO: 186 K/UL — SIGNIFICANT CHANGE UP (ref 150–400)
POTASSIUM SERPL-MCNC: 4.2 MMOL/L — SIGNIFICANT CHANGE UP (ref 3.5–5.3)
POTASSIUM SERPL-SCNC: 4.2 MMOL/L — SIGNIFICANT CHANGE UP (ref 3.5–5.3)
RBC # BLD: 4.88 M/UL — SIGNIFICANT CHANGE UP (ref 4.2–5.8)
RBC # FLD: 12.2 % — SIGNIFICANT CHANGE UP (ref 10.3–14.5)
SODIUM SERPL-SCNC: 137 MMOL/L — SIGNIFICANT CHANGE UP (ref 135–145)
WBC # BLD: 7.09 K/UL — SIGNIFICANT CHANGE UP (ref 3.8–10.5)
WBC # FLD AUTO: 7.09 K/UL — SIGNIFICANT CHANGE UP (ref 3.8–10.5)

## 2021-04-18 PROCEDURE — 99291 CRITICAL CARE FIRST HOUR: CPT

## 2021-04-18 RX ORDER — CHOLECALCIFEROL (VITAMIN D3) 125 MCG
4000 CAPSULE ORAL DAILY
Refills: 0 | Status: DISCONTINUED | OUTPATIENT
Start: 2021-04-18 | End: 2021-04-24

## 2021-04-18 RX ORDER — ENOXAPARIN SODIUM 100 MG/ML
40 INJECTION SUBCUTANEOUS EVERY 12 HOURS
Refills: 0 | Status: DISCONTINUED | OUTPATIENT
Start: 2021-04-18 | End: 2021-04-24

## 2021-04-18 RX ORDER — GUAIFENESIN/DEXTROMETHORPHAN 600MG-30MG
10 TABLET, EXTENDED RELEASE 12 HR ORAL EVERY 6 HOURS
Refills: 0 | Status: DISCONTINUED | OUTPATIENT
Start: 2021-04-18 | End: 2021-04-19

## 2021-04-18 RX ORDER — ASCORBIC ACID 60 MG
1000 TABLET,CHEWABLE ORAL
Refills: 0 | Status: DISCONTINUED | OUTPATIENT
Start: 2021-04-18 | End: 2021-04-24

## 2021-04-18 RX ADMIN — Medication 10 MILLILITER(S): at 17:16

## 2021-04-18 RX ADMIN — ZINC SULFATE TAB 220 MG (50 MG ZINC EQUIVALENT) 220 MILLIGRAM(S): 220 (50 ZN) TAB at 12:25

## 2021-04-18 RX ADMIN — ENOXAPARIN SODIUM 40 MILLIGRAM(S): 100 INJECTION SUBCUTANEOUS at 17:15

## 2021-04-18 RX ADMIN — Medication 500 MILLIGRAM(S): at 06:38

## 2021-04-18 RX ADMIN — Medication 1000 MILLIGRAM(S): at 17:16

## 2021-04-18 RX ADMIN — Medication 10 MILLILITER(S): at 23:04

## 2021-04-18 RX ADMIN — Medication 650 MILLIGRAM(S): at 06:45

## 2021-04-18 RX ADMIN — REMDESIVIR 500 MILLIGRAM(S): 5 INJECTION INTRAVENOUS at 23:04

## 2021-04-18 RX ADMIN — Medication 6 MILLIGRAM(S): at 06:30

## 2021-04-18 RX ADMIN — Medication 200 MILLIGRAM(S): at 06:50

## 2021-04-18 RX ADMIN — ALBUTEROL 2 PUFF(S): 90 AEROSOL, METERED ORAL at 04:55

## 2021-04-18 RX ADMIN — Medication 200 MILLIGRAM(S): at 13:58

## 2021-04-18 RX ADMIN — ALBUTEROL 2 PUFF(S): 90 AEROSOL, METERED ORAL at 16:47

## 2021-04-18 RX ADMIN — Medication 650 MILLIGRAM(S): at 07:45

## 2021-04-18 RX ADMIN — FAMOTIDINE 20 MILLIGRAM(S): 10 INJECTION INTRAVENOUS at 12:24

## 2021-04-18 RX ADMIN — ENOXAPARIN SODIUM 40 MILLIGRAM(S): 100 INJECTION SUBCUTANEOUS at 12:24

## 2021-04-18 RX ADMIN — ALBUTEROL 2 PUFF(S): 90 AEROSOL, METERED ORAL at 09:42

## 2021-04-18 RX ADMIN — Medication 2000 UNIT(S): at 12:21

## 2021-04-18 RX ADMIN — ALBUTEROL 2 PUFF(S): 90 AEROSOL, METERED ORAL at 21:46

## 2021-04-18 NOTE — PROGRESS NOTE ADULT - ASSESSMENT
Assessment  1. COVID 19 PNA  2. Hypoxia   3. h/o High chol    Plan  - At this time not a candidate for Toci, will continue ot monitor clinical status in AM.   - Continue Decadron 6mg for 10 day course, Remdisivir for 5-10 day course    - Monitor pulse renal and liver function  - Supplemental o2 to maintain sat > 90%    - Taper as tolerated    - Continuos Pulse ox monitoring   - Zinc/Vit C/Vit D  - Strict COVID 19 Isolation  - Cough suppression with Robitussin DM  - Trend biomarkers of inflammation  - DVT ppx  - GI PPX with Pepcid        COVID 19 specific considerations and therapeutic options based on the available and rapidly changing literature

## 2021-04-18 NOTE — PROGRESS NOTE ADULT - ASSESSMENT
45M no sigPMHx other than HLD admitted with acute respiratory failure with hypoxia sec COVID- 19 PNA.    #COVID PNA  #Acute Hypoxic Resp failure  -CT of chest: patchy b/l groundglass infiltrates  -cont supplemental oxygen with oximizer  -continuous pulse ox  -proning as tolerated  -day 3 of IV Decadron and IV Remdesivir   -cont Albuterol INH ATC, prn, Zinc, Guaifenesin, Vit D, Vit C  -elevated ferritin and elevated CRP 57, D-dimer 412-- follow inflammatory markers q72h    #mild Hyponatremia - resolved  -continue to monitor Na levels    #mild Transaminitis  -secondary to Covid infection  -cont to monitor LFT's    #DVT ppx: lovenox    Dispo:  Kunal Balderas [phone]; updated him on plan of care.   45M no sigPMHx other than HLD admitted with acute respiratory failure with hypoxia sec COVID- 19 PNA.    #COVID PNA  #Acute Hypoxic Resp failure  -CT of chest: patchy b/l groundglass infiltrates  -cont supplemental oxygen with oximizer  -continuous pulse ox  -proning as tolerated  -day 3 of IV Decadron and IV Remdesivir   -cont Albuterol INH ATC, prn, Zinc, Guaifenesin, Vit D, Vit C  -elevated ferritin and elevated CRP 57, D-dimer 412-- follow inflammatory markers q72h    #mild Hyponatremia - resolved  -continue to monitor Na levels    #mild Transaminitis  -secondary to Covid infection  -cont to monitor LFT's    #DVT ppx: lovenox    Updated Son, Kunal [phone] on plan of care.   45M no sigPMHx other than HLD admitted with acute respiratory failure with hypoxia sec COVID- 19 PNA.    #COVID PNA  #Acute Hypoxic Resp failure  -CT of chest: patchy b/l groundglass infiltrates  -cont supplemental oxygen with oximizer  -continuous pulse ox  -proning as tolerated  -day 3 of IV Decadron and IV Remdesivir   -cont Albuterol INH ATC, prn, Zinc, Guaifenesin, Vit D, Vit C  -elevated ferritin and elevated CRP 57, D-dimer 412-- follow inflammatory markers q72h    Upon my evaluation, this patient has a high probability of imminent of life-threatening deterioration, which required my direct attention, intervention, and personal management.  I have personally provided 60 minutes of critical care time. This time includes ordering/interpreting laboratory data, radiology results, as well as discussion with consultants, patients and their family/guardian and monitoring for potential decompensation. This excludes time spent in teaching of residents/PAs/NPs and time spent on separately billable procedures.     #mild Hyponatremia - resolved  -continue to monitor Na levels    #mild Transaminitis  -secondary to Covid infection  -cont to monitor LFT's    #DVT ppx: lovenox    Updated Son, Kunal [phone] on plan of care.   45M no sigPMHx other than HLD admitted with acute respiratory failure with hypoxia sec COVID- 19 PNA.    #COVID PNA  #Acute Hypoxic Resp failure  -CT of chest: patchy b/l groundglass infiltrates  -cont supplemental oxygen with oximizer  -continuous pulse ox  -proning as tolerated  -day 3 of IV Decadron and IV Remdesivir   -cont Albuterol INH ATC, prn, Zinc, Guaifenesin, Vit D, Vit C  -elevated ferritin and elevated CRP 57, D-dimer 412-- follow inflammatory markers q72h    Upon my evaluation, this patient has a high probability of imminent of life-threatening deterioration, which required my direct attention, intervention, and personal management.  I have personally provided 60 minutes of critical care time. This time includes ordering/interpreting laboratory data, radiology results, as well as discussion with consultants, patients and their family/guardian and monitoring for potential decompensation. This excludes time spent in teaching of residents/PAs/NPs and time spent on separately billable procedures.     #CKD IIIa  - likely chronic  - continue to monitor  - avoid nephrotoxic agents  - consider renal US if worsening renal function    #mild Transaminitis  -secondary to Covid infection  -cont to monitor LFT's    #DVT ppx: lovenox    Updated Son, Kunal [phone] on plan of care.

## 2021-04-18 NOTE — PROGRESS NOTE ADULT - SUBJECTIVE AND OBJECTIVE BOX
Patient is a 45y old  Male who presents with a chief complaint of COVID 19 (17 Apr 2021 09:16)      Patient seen and examined at bedside.  O2 sat dropped overnight and increased to 8L this AM  No complaints this morning but 89% while prone  Ordering Oxymizer and Pulm consult    ALLERGIES:  No Known Allergies    MEDICATIONS  (STANDING):  ALBUTerol    90 MICROgram(s) HFA Inhaler 2 Puff(s) Inhalation every 6 hours  ascorbic acid 500 milliGRAM(s) Oral two times a day  cholecalciferol 2000 Unit(s) Oral daily  dexAMETHasone  Injectable 6 milliGRAM(s) IV Push daily  enoxaparin Injectable 40 milliGRAM(s) SubCutaneous daily  famotidine    Tablet 20 milliGRAM(s) Oral daily  remdesivir  IVPB   IV Intermittent   remdesivir  IVPB 100 milliGRAM(s) IV Intermittent every 24 hours  zinc sulfate 220 milliGRAM(s) Oral daily    MEDICATIONS  (PRN):  acetaminophen   Tablet .. 650 milliGRAM(s) Oral every 6 hours PRN Temp greater or equal to 38C (100.4F), Mild Pain (1 - 3)  guaiFENesin   Syrup  (Sugar-Free) 200 milliGRAM(s) Oral every 6 hours PRN Cough  ondansetron Injectable 4 milliGRAM(s) IV Push every 8 hours PRN Nausea and/or Vomiting    Vital Signs Last 24 Hrs  T(F): 98.6 (18 Apr 2021 05:22), Max: 99.4 (17 Apr 2021 20:38)  HR: 82 (18 Apr 2021 05:45) (72 - 87)  BP: 125/85 (18 Apr 2021 05:22) (119/83 - 125/85)  RR: 20 (18 Apr 2021 05:45) (13 - 20)  SpO2: 92% (18 Apr 2021 05:45) (72% - 94%)  I&O's Summary    17 Apr 2021 07:01  -  18 Apr 2021 07:00  --------------------------------------------------------  IN: 250 mL / OUT: 0 mL / NET: 250 mL      BMI (kg/m2): 28.9 (04-16-21 @ 20:46), 30.4 (04-15-21 @ 08:11)    PHYSICAL EXAM:  GENERAL: NAD  HENT:  Atraumatic, Normocephalic; No tonsillar erythema, exudates, or enlargement; Moist mucous membranes;   EYES: EOMI, PERRLA, conjunctiva and sclera clear, no lid-lag  NECK: Supple, No JVD, Normal thyroid  CHEST/LUNG: Decreased breath sounds B/L; No rales, rhonchi, wheezing, or rubs; normal respiratory effort, no intercostal retractions  HEART: Regular rate and rhythm; No murmurs, rubs, or gallops  ABDOMEN: Soft, Nontender, Nondistended; Bowel sounds present; No HSM  MUSCULOSKELETAL/EXTREMITIES:  2+ Peripheral Pulses, No clubbing, cyanosis, or peripheral edema; No digital cyanosis  SKIN: No rashes or lesions; normal texture and temperature  PSYCH: Appropriate affect, Alert & Oriented x 3    LABS:                        14.6   7.09  )-----------( 186      ( 18 Apr 2021 05:45 )             41.4       04-18    137  |  101  |  13  ----------------------------<  146  4.2   |  29  |  1.01    Ca    9.3      18 Apr 2021 05:45    TPro  6.9  /  Alb  2.8  /  TBili  0.6  /  DBili  x   /  AST  53  /  ALT  82  /  AlkPhos  73  04-17     eGFR if : 103 mL/min/1.73M2 (04-18-21 @ 05:45)  eGFR if Non African American: 88 mL/min/1.73M2 (04-18-21 @ 05:45)    PT/INR - ( 16 Apr 2021 21:30 )   PT: 14.7 sec;   INR: 1.23 ratio         PTT - ( 16 Apr 2021 21:30 )  PTT:28.2 sec   Lactate, Blood: 1.5 mmol/L (04-16 @ 21:30)    CARDIAC MARKERS ( 17 Apr 2021 05:10 )  <.017 ng/mL / x     / x     / x     / x        Culture - Urine (collected 15 Apr 2021 10:30)  Source: .Urine Clean Catch (Midstream)  Final Report (16 Apr 2021 11:35):    <10,000 CFU/mL Normal Urogenital Sofiya    Culture - Blood (collected 15 Apr 2021 08:20)  Source: .Blood Blood-Peripheral  Preliminary Report (16 Apr 2021 14:01):    No growth to date.    Culture - Blood (collected 15 Apr 2021 08:20)  Source: .Blood Blood-Peripheral  Preliminary Report (16 Apr 2021 14:01):    No growth to date.        Care Discussed with Consultants/Other Providers: Yes   Patient is a 45y old  Male who presents with a chief complaint of COVID 19 (17 Apr 2021 09:16)      Patient seen and examined at bedside.  O2 sat dropped overnight and increased to 8L this AM  No complaints this morning but 89% while prone  Ordering Oxymizer  Patient placed on 6L O2 with oxymizer. O2 Sat between 92 and 94%  Pulm consulted    ALLERGIES:  No Known Allergies    MEDICATIONS  (STANDING):  ALBUTerol    90 MICROgram(s) HFA Inhaler 2 Puff(s) Inhalation every 6 hours  ascorbic acid 500 milliGRAM(s) Oral two times a day  cholecalciferol 2000 Unit(s) Oral daily  dexAMETHasone  Injectable 6 milliGRAM(s) IV Push daily  enoxaparin Injectable 40 milliGRAM(s) SubCutaneous daily  famotidine    Tablet 20 milliGRAM(s) Oral daily  remdesivir  IVPB   IV Intermittent   remdesivir  IVPB 100 milliGRAM(s) IV Intermittent every 24 hours  zinc sulfate 220 milliGRAM(s) Oral daily    MEDICATIONS  (PRN):  acetaminophen   Tablet .. 650 milliGRAM(s) Oral every 6 hours PRN Temp greater or equal to 38C (100.4F), Mild Pain (1 - 3)  guaiFENesin   Syrup  (Sugar-Free) 200 milliGRAM(s) Oral every 6 hours PRN Cough  ondansetron Injectable 4 milliGRAM(s) IV Push every 8 hours PRN Nausea and/or Vomiting    Vital Signs Last 24 Hrs  T(F): 98.6 (18 Apr 2021 05:22), Max: 99.4 (17 Apr 2021 20:38)  HR: 82 (18 Apr 2021 05:45) (72 - 87)  BP: 125/85 (18 Apr 2021 05:22) (119/83 - 125/85)  RR: 20 (18 Apr 2021 05:45) (13 - 20)  SpO2: 92% (18 Apr 2021 05:45) (72% - 94%)  I&O's Summary    17 Apr 2021 07:01  -  18 Apr 2021 07:00  --------------------------------------------------------  IN: 250 mL / OUT: 0 mL / NET: 250 mL      BMI (kg/m2): 28.9 (04-16-21 @ 20:46), 30.4 (04-15-21 @ 08:11)    PHYSICAL EXAM:  GENERAL: NAD  HENT:  Atraumatic, Normocephalic; No tonsillar erythema, exudates, or enlargement; Moist mucous membranes;   EYES: EOMI, PERRLA, conjunctiva and sclera clear, no lid-lag  NECK: Supple, No JVD, Normal thyroid  CHEST/LUNG: Decreased breath sounds B/L; No rales, rhonchi, wheezing, or rubs; normal respiratory effort, no intercostal retractions  HEART: Regular rate and rhythm; No murmurs, rubs, or gallops  ABDOMEN: Soft, Nontender, Nondistended; Bowel sounds present; No HSM  MUSCULOSKELETAL/EXTREMITIES:  2+ Peripheral Pulses, No clubbing, cyanosis, or peripheral edema; No digital cyanosis  SKIN: No rashes or lesions; normal texture and temperature  PSYCH: Appropriate affect, Alert & Oriented x 3    LABS:                        14.6   7.09  )-----------( 186      ( 18 Apr 2021 05:45 )             41.4       04-18    137  |  101  |  13  ----------------------------<  146  4.2   |  29  |  1.01    Ca    9.3      18 Apr 2021 05:45    TPro  6.9  /  Alb  2.8  /  TBili  0.6  /  DBili  x   /  AST  53  /  ALT  82  /  AlkPhos  73  04-17     eGFR if : 103 mL/min/1.73M2 (04-18-21 @ 05:45)  eGFR if Non African American: 88 mL/min/1.73M2 (04-18-21 @ 05:45)    PT/INR - ( 16 Apr 2021 21:30 )   PT: 14.7 sec;   INR: 1.23 ratio         PTT - ( 16 Apr 2021 21:30 )  PTT:28.2 sec   Lactate, Blood: 1.5 mmol/L (04-16 @ 21:30)    CARDIAC MARKERS ( 17 Apr 2021 05:10 )  <.017 ng/mL / x     / x     / x     / x        Culture - Urine (collected 15 Apr 2021 10:30)  Source: .Urine Clean Catch (Midstream)  Final Report (16 Apr 2021 11:35):    <10,000 CFU/mL Normal Urogenital Sofiya    Culture - Blood (collected 15 Apr 2021 08:20)  Source: .Blood Blood-Peripheral  Preliminary Report (16 Apr 2021 14:01):    No growth to date.    Culture - Blood (collected 15 Apr 2021 08:20)  Source: .Blood Blood-Peripheral  Preliminary Report (16 Apr 2021 14:01):    No growth to date.      Care Discussed with Consultants/Other Providers: Yes

## 2021-04-18 NOTE — PROGRESS NOTE ADULT - SUBJECTIVE AND OBJECTIVE BOX
PULMONARY CONSULT  Location of Patient : [facility] [address] [place] [facility]  Attending requesting Consult:Estefani Ann  Chief Complaint :  SOB  Reason For consult : SOB      Initial HPI on admission:  HPI:  45 year old male with h/o high chol, not on meds, who p/w COVID 19 PNA>   Multiple family memebers are sick and hospitalized, He Became symptomatic about 4 days ago with fevers, chills, myalgia, abd pain, cough and SOB. Denied vomiting, diarrhea, chest pain, leg swelling, loss of taste or smell.   Came to ED yesterday for evaluation and had CTAP neg for abd pathology but +bl GGO cw COVID.       BRIEF HOSPITAL COURSE:   patient hospitalized and started on steroids and Remdivir  o2 requirments increased now to 6 L oxymizer - sat 95%  patient states feels better with o2  mild cough, no secretions  no fever      PAST MEDICAL & SURGICAL HISTORY:  Dyslipidemia    No significant past surgical history      Allergies    No Known Allergies    Intolerances      FAMILY HISTORY:    Social history: Social History:  denied tob, ETOH or illicit drug use. (16 Apr 2021 23:08)       Smoking: denies     Drinking: denies     Drug use: denies     Review of Systems: as stated above    CONSTITUTIONAL: No fever, No chills, +fatigue  EYES: No eye pain, No visual disturbances, No discharge  ENMT:  No difficulty hearing, No tinnitus, No vertigo; No sinus or throat pain  NECK: No pain, No stiffness  RESPIRATORY: No Cough, No SOB, No Secretions  CARDIOVASCULAR: No chest pain, No palpitations, No dizziness, or No leg swelling  GASTROINTESTINAL: No abdominal or epigastric pain. No nausea, No vomiting, No hematemesis; No diarrhea, No constipation. No melena, No hematochezia.  GENITOURINARY: No dysuria, No frequency, No hematuria, No incontinence  NEUROLOGICAL: No headaches, No memory loss, No loss of strength, No numbness, No tremors  SKIN: No itching, No burning, No rashes, No lesions   MUSCULOSKELETAL: No joint pain or swelling; No muscle, back, No extremity pain  PSYCHIATRIC: No depression, No anxiety, No mood swings, No difficulty sleeping      Medications:  MEDICATIONS  (STANDING):  ALBUTerol    90 MICROgram(s) HFA Inhaler 2 Puff(s) Inhalation every 6 hours  ascorbic acid 500 milliGRAM(s) Oral two times a day  cholecalciferol 2000 Unit(s) Oral daily  dexAMETHasone  Injectable 6 milliGRAM(s) IV Push daily  enoxaparin Injectable 40 milliGRAM(s) SubCutaneous daily  famotidine    Tablet 20 milliGRAM(s) Oral daily  remdesivir  IVPB   IV Intermittent   remdesivir  IVPB 100 milliGRAM(s) IV Intermittent every 24 hours  zinc sulfate 220 milliGRAM(s) Oral daily    MEDICATIONS  (PRN):  acetaminophen   Tablet .. 650 milliGRAM(s) Oral every 6 hours PRN Temp greater or equal to 38C (100.4F), Mild Pain (1 - 3)  guaiFENesin   Syrup  (Sugar-Free) 200 milliGRAM(s) Oral every 6 hours PRN Cough  guaiFENesin   Syrup  (Sugar-Free) 200 milliGRAM(s) Oral two times a day PRN Cough  ondansetron Injectable 4 milliGRAM(s) IV Push every 8 hours PRN Nausea and/or Vomiting      Home Medications:  Last Order Reconciliation Date: Not Done  albuterol 90 mcg/inh inhalation aerosol: 2 puff(s) inhaled 4 times a day  (04-16-21 @ 22:18)  Mucinex 600 mg oral tablet, extended release: 1 tab(s) orally every 12 hours  (04-16-21 @ 22:18)  Tylenol 8 Hour 650 mg oral tablet, extended release: 1 tab(s) orally every 8 hours  (04-16-21 @ 22:18)      LABS:                        14.6   7.09  )-----------( 186      ( 18 Apr 2021 05:45 )             41.4     04-18    137  |  101  |  13  ----------------------------<  146<H>  4.2   |  29  |  1.01    Ca    9.3      18 Apr 2021 05:45    TPro  6.9  /  Alb  2.8<L>  /  TBili  0.6  /  DBili  x   /  AST  53<H>  /  ALT  82<H>  /  AlkPhos  73  04-17    HIT ab -- 04-16 @ 21:30  HIT ab EIA --  D Dimer -412    CARDIAC MARKERS ( 17 Apr 2021 05:10 )  <.017 ng/mL / x     / x     / x     / x            PT/INR - ( 16 Apr 2021 21:30 )   PT: 14.7 sec;   INR: 1.23 ratio         PTT - ( 16 Apr 2021 21:30 )  PTT:28.2 sec    Procalcitonin, Serum: 0.12 ng/mL (04-16-21 @ 21:30)    Serum Pro-Brain Natriuretic Peptide: 65 pg/mL (04-17-21 @ 05:10)  COVID  04-16-21 @ 21:30  COVID -   Detected<!>  04-15-21 @ 08:20  COVID -   Detected<!>      COVID Biomarkers    04-17-21 @ 05:50 ESR --  ---  <H>  ---  DDimer  --   ---   <H>   ---   Ferritin 1455<H>    04-16-21 @ 21:30 ESR --  ---  CRP 65<H>  ---  DDimer  412<H>   ---   LDH --   ---   Ferritin 1486<H>    04-15-21 @ 08:20 ESR --  ---  CRP 57<H>  ---  DDimer  --   ---   LDH --   ---   Ferritin 1217<H>                CULTURES: (if applicable)    Culture - Urine (collected 04-15-21 @ 10:30)  Source: .Urine Clean Catch (Midstream)  Final Report (04-16-21 @ 11:35):    <10,000 CFU/mL Normal Urogenital Sofiya    Culture - Blood (collected 04-15-21 @ 08:20)  Source: .Blood Blood-Peripheral  Preliminary Report (04-16-21 @ 14:01):    No growth to date.    Culture - Blood (collected 04-15-21 @ 08:20)  Source: .Blood Blood-Peripheral  Preliminary Report (04-16-21 @ 14:01):    No growth to date.             RADIOLOGY  CXR:   < from: Xray Chest 1 View- PORTABLE-Urgent (04.16.21 @ 21:34) >    EXAM:  XR CHEST PORTABLE URGENT 1V      PROCEDURE DATE:  04/16/2021        INTERPRETATION:  Portable chest radiograph    CLINICAL INFORMATION: Dyspnea, shortness of breath.    TECHNIQUE:  Portable  AP view of the chest was obtained.    COMPARISON: 4/15/2021 chest available for review.    FINDINGS:    The lungs show increasing bilateral  multifocal and diffuse ill-defined airspace opacities. No pneumothorax.    The heart and mediastinum are within normal limits.    Visualized osseous structures are intact.      IMPRESSION:   Increasing bilateral  multifocal and diffuse ill-defined airspace opacities.      < end of copied text >        VITALS:  T(C): 37 (04-18-21 @ 05:22), Max: 37.4 (04-17-21 @ 20:38)  T(F): 98.6 (04-18-21 @ 05:22), Max: 99.4 (04-17-21 @ 20:38)  HR: 82 (04-18-21 @ 05:45) (72 - 87)  BP: 125/85 (04-18-21 @ 05:22) (119/83 - 125/85)  BP(mean): --  ABP: --  ABP(mean): --  RR: 20 (04-18-21 @ 05:45) (13 - 20)  SpO2: 92% (04-18-21 @ 05:45) (72% - 94%)  CVP(mm Hg): --  CVP(cm H2O): --    Ins and Outs     04-17-21 @ 07:01  -  04-18-21 @ 07:00  --------------------------------------------------------  IN: 250 mL / OUT: 0 mL / NET: 250 mL    04-18-21 @ 07:01  -  04-18-21 @ 15:04  --------------------------------------------------------  IN: 0 mL / OUT: 350 mL / NET: -350 mL        Height (cm): 172.7 (04-16-21 @ 20:46)  Weight (kg): 86.2 (04-16-21 @ 20:46)  BMI (kg/m2): 28.9 (04-16-21 @ 20:46)        I&O's Detail    17 Apr 2021 07:01  -  18 Apr 2021 07:00  --------------------------------------------------------  IN:    IV PiggyBack: 250 mL  Total IN: 250 mL    OUT:  Total OUT: 0 mL    Total NET: 250 mL      18 Apr 2021 07:01  -  18 Apr 2021 15:04  --------------------------------------------------------  IN:  Total IN: 0 mL    OUT:    Voided (mL): 350 mL  Total OUT: 350 mL    Total NET: -350 mL          Physical Examination:  GENERAL:               Alert, Oriented, No acute distress.    HEENT:                    No JVD, Moist MM  PULM:                     Bilateral air entry, Clear to auscultation bilaterally, no significant sputum production, No Rales, No Rhonchi, No Wheezing  CVS:                         S1, S2,  No Murmur  ABD:                        Soft, nondistended, nontender, normoactive bowel sounds,   EXT:                         No edema, nontender, No Cyanosis or Clubbing   Vascular:                Warm Extremities,    SKIN:                       Warm and well perfused, no rashes noted.   NEURO:                  Alert, oriented, interactive, nonfocal, follows commands  PSYC:                      Calm, + Insight.

## 2021-04-19 LAB
ALBUMIN SERPL ELPH-MCNC: 2.8 G/DL — LOW (ref 3.3–5)
ALP SERPL-CCNC: 61 U/L — SIGNIFICANT CHANGE UP (ref 40–120)
ALT FLD-CCNC: 94 U/L — HIGH (ref 10–45)
ANION GAP SERPL CALC-SCNC: 9 MMOL/L — SIGNIFICANT CHANGE UP (ref 5–17)
AST SERPL-CCNC: 41 U/L — HIGH (ref 10–40)
BASOPHILS # BLD AUTO: 0.01 K/UL — SIGNIFICANT CHANGE UP (ref 0–0.2)
BASOPHILS NFR BLD AUTO: 0.1 % — SIGNIFICANT CHANGE UP (ref 0–2)
BILIRUB SERPL-MCNC: 0.6 MG/DL — SIGNIFICANT CHANGE UP (ref 0.2–1.2)
BUN SERPL-MCNC: 14 MG/DL — SIGNIFICANT CHANGE UP (ref 7–23)
CALCIUM SERPL-MCNC: 8.4 MG/DL — SIGNIFICANT CHANGE UP (ref 8.4–10.5)
CHLORIDE SERPL-SCNC: 104 MMOL/L — SIGNIFICANT CHANGE UP (ref 96–108)
CO2 SERPL-SCNC: 27 MMOL/L — SIGNIFICANT CHANGE UP (ref 22–31)
CREAT SERPL-MCNC: 0.9 MG/DL — SIGNIFICANT CHANGE UP (ref 0.5–1.3)
CRP SERPL-MCNC: 55 MG/L — HIGH
D DIMER BLD IA.RAPID-MCNC: 317 NG/ML DDU — HIGH
EOSINOPHIL # BLD AUTO: 0 K/UL — SIGNIFICANT CHANGE UP (ref 0–0.5)
EOSINOPHIL NFR BLD AUTO: 0 % — SIGNIFICANT CHANGE UP (ref 0–6)
FERRITIN SERPL-MCNC: 1727 NG/ML — HIGH (ref 30–400)
GLUCOSE SERPL-MCNC: 158 MG/DL — HIGH (ref 70–99)
HCT VFR BLD CALC: 42.7 % — SIGNIFICANT CHANGE UP (ref 39–50)
HGB BLD-MCNC: 14.8 G/DL — SIGNIFICANT CHANGE UP (ref 13–17)
IMM GRANULOCYTES NFR BLD AUTO: 0.6 % — SIGNIFICANT CHANGE UP (ref 0–1.5)
LYMPHOCYTES # BLD AUTO: 0.94 K/UL — LOW (ref 1–3.3)
LYMPHOCYTES # BLD AUTO: 10.6 % — LOW (ref 13–44)
MAGNESIUM SERPL-MCNC: 2.3 MG/DL — SIGNIFICANT CHANGE UP (ref 1.6–2.6)
MCHC RBC-ENTMCNC: 29.7 PG — SIGNIFICANT CHANGE UP (ref 27–34)
MCHC RBC-ENTMCNC: 34.7 GM/DL — SIGNIFICANT CHANGE UP (ref 32–36)
MCV RBC AUTO: 85.7 FL — SIGNIFICANT CHANGE UP (ref 80–100)
MONOCYTES # BLD AUTO: 0.42 K/UL — SIGNIFICANT CHANGE UP (ref 0–0.9)
MONOCYTES NFR BLD AUTO: 4.7 % — SIGNIFICANT CHANGE UP (ref 2–14)
NEUTROPHILS # BLD AUTO: 7.45 K/UL — HIGH (ref 1.8–7.4)
NEUTROPHILS NFR BLD AUTO: 84 % — HIGH (ref 43–77)
NRBC # BLD: 0 /100 WBCS — SIGNIFICANT CHANGE UP (ref 0–0)
PHOSPHATE SERPL-MCNC: 3.4 MG/DL — SIGNIFICANT CHANGE UP (ref 2.5–4.5)
PLATELET # BLD AUTO: 217 K/UL — SIGNIFICANT CHANGE UP (ref 150–400)
POTASSIUM SERPL-MCNC: 4.2 MMOL/L — SIGNIFICANT CHANGE UP (ref 3.5–5.3)
POTASSIUM SERPL-SCNC: 4.2 MMOL/L — SIGNIFICANT CHANGE UP (ref 3.5–5.3)
PROT SERPL-MCNC: 6.9 G/DL — SIGNIFICANT CHANGE UP (ref 6–8.3)
RBC # BLD: 4.98 M/UL — SIGNIFICANT CHANGE UP (ref 4.2–5.8)
RBC # FLD: 12.2 % — SIGNIFICANT CHANGE UP (ref 10.3–14.5)
SODIUM SERPL-SCNC: 140 MMOL/L — SIGNIFICANT CHANGE UP (ref 135–145)
WBC # BLD: 8.87 K/UL — SIGNIFICANT CHANGE UP (ref 3.8–10.5)
WBC # FLD AUTO: 8.87 K/UL — SIGNIFICANT CHANGE UP (ref 3.8–10.5)

## 2021-04-19 PROCEDURE — 99233 SBSQ HOSP IP/OBS HIGH 50: CPT

## 2021-04-19 RX ORDER — REMDESIVIR 5 MG/ML
100 INJECTION INTRAVENOUS EVERY 24 HOURS
Refills: 0 | Status: DISCONTINUED | OUTPATIENT
Start: 2021-04-21 | End: 2021-04-24

## 2021-04-19 RX ORDER — TOCILIZUMAB 20 MG/ML
600 INJECTION, SOLUTION, CONCENTRATE INTRAVENOUS ONCE
Refills: 0 | Status: COMPLETED | OUTPATIENT
Start: 2021-04-19 | End: 2021-04-19

## 2021-04-19 RX ORDER — SENNA PLUS 8.6 MG/1
2 TABLET ORAL AT BEDTIME
Refills: 0 | Status: DISCONTINUED | OUTPATIENT
Start: 2021-04-19 | End: 2021-04-24

## 2021-04-19 RX ORDER — POLYETHYLENE GLYCOL 3350 17 G/17G
17 POWDER, FOR SOLUTION ORAL
Refills: 0 | Status: DISCONTINUED | OUTPATIENT
Start: 2021-04-19 | End: 2021-04-21

## 2021-04-19 RX ORDER — GUAIFENESIN/DEXTROMETHORPHAN 600MG-30MG
10 TABLET, EXTENDED RELEASE 12 HR ORAL EVERY 6 HOURS
Refills: 0 | Status: DISCONTINUED | OUTPATIENT
Start: 2021-04-19 | End: 2021-04-24

## 2021-04-19 RX ORDER — TOCILIZUMAB 20 MG/ML
600 INJECTION, SOLUTION, CONCENTRATE INTRAVENOUS ONCE
Refills: 0 | Status: DISCONTINUED | OUTPATIENT
Start: 2021-04-19 | End: 2021-04-19

## 2021-04-19 RX ORDER — SODIUM CHLORIDE 0.65 %
1 AEROSOL, SPRAY (ML) NASAL EVERY 4 HOURS
Refills: 0 | Status: DISCONTINUED | OUTPATIENT
Start: 2021-04-19 | End: 2021-04-24

## 2021-04-19 RX ADMIN — TOCILIZUMAB 100 MILLIGRAM(S): 20 INJECTION, SOLUTION, CONCENTRATE INTRAVENOUS at 14:58

## 2021-04-19 RX ADMIN — ENOXAPARIN SODIUM 40 MILLIGRAM(S): 100 INJECTION SUBCUTANEOUS at 05:39

## 2021-04-19 RX ADMIN — Medication 1000 MILLIGRAM(S): at 17:45

## 2021-04-19 RX ADMIN — ALBUTEROL 2 PUFF(S): 90 AEROSOL, METERED ORAL at 22:05

## 2021-04-19 RX ADMIN — SENNA PLUS 2 TABLET(S): 8.6 TABLET ORAL at 21:35

## 2021-04-19 RX ADMIN — POLYETHYLENE GLYCOL 3350 17 GRAM(S): 17 POWDER, FOR SOLUTION ORAL at 17:45

## 2021-04-19 RX ADMIN — Medication 10 MILLILITER(S): at 05:39

## 2021-04-19 RX ADMIN — ZINC SULFATE TAB 220 MG (50 MG ZINC EQUIVALENT) 220 MILLIGRAM(S): 220 (50 ZN) TAB at 12:12

## 2021-04-19 RX ADMIN — Medication 1 SPRAY(S): at 21:35

## 2021-04-19 RX ADMIN — Medication 6 MILLIGRAM(S): at 05:39

## 2021-04-19 RX ADMIN — ENOXAPARIN SODIUM 40 MILLIGRAM(S): 100 INJECTION SUBCUTANEOUS at 17:45

## 2021-04-19 RX ADMIN — ALBUTEROL 2 PUFF(S): 90 AEROSOL, METERED ORAL at 09:33

## 2021-04-19 RX ADMIN — Medication 100 MILLIGRAM(S): at 14:59

## 2021-04-19 RX ADMIN — ALBUTEROL 2 PUFF(S): 90 AEROSOL, METERED ORAL at 04:31

## 2021-04-19 RX ADMIN — Medication 1000 MILLIGRAM(S): at 05:38

## 2021-04-19 RX ADMIN — ALBUTEROL 2 PUFF(S): 90 AEROSOL, METERED ORAL at 15:44

## 2021-04-19 RX ADMIN — Medication 100 MILLIGRAM(S): at 21:35

## 2021-04-19 RX ADMIN — POLYETHYLENE GLYCOL 3350 17 GRAM(S): 17 POWDER, FOR SOLUTION ORAL at 12:11

## 2021-04-19 RX ADMIN — REMDESIVIR 500 MILLIGRAM(S): 5 INJECTION INTRAVENOUS at 23:00

## 2021-04-19 RX ADMIN — FAMOTIDINE 20 MILLIGRAM(S): 10 INJECTION INTRAVENOUS at 12:11

## 2021-04-19 RX ADMIN — Medication 4000 UNIT(S): at 12:12

## 2021-04-19 NOTE — PROVIDER CONTACT NOTE (OTHER) - SITUATION
pt has infection with covid virus.  oxygen sat 91-94% on 8L NC.
Patient found sitting supine, desaturating to 84%.  Currently proned, receiving 14L via Oxymizer and saturating at 89%.

## 2021-04-19 NOTE — PROGRESS NOTE ADULT - ASSESSMENT
45M no sigPMHx other than HLD admitted with acute respiratory failure with hypoxia sec COVID- 19 PNA.    #COVID PNA  #Acute Hypoxic Resp failure  -CT of chest: patchy b/l groundglass infiltrates  -cont supplemental oxygen with oximizer  -continuous pulse ox  -proning as tolerated  -day 4 of IV Decadron and IV Remdesivir   -cont Albuterol INH ATC, prn, Zinc, Guaifenesin, Vit D, Vit C  -elevated ferritin and elevated CRP 57, D-dimer 412-- follow inflammatory markers q72h    #DANIELLA vs CKD IIIa  - likely chronic but renal function improving  - continue to monitor  - avoid nephrotoxic agents  - consider renal US if worsening renal function    #mild Transaminitis  -secondary to Covid infection  -cont to monitor LFT's    #DVT ppx: lovenox    Updated SonKunal [phone] on plan of care.       45M no sigPMHx other than HLD admitted with acute respiratory failure with hypoxia sec COVID- 19 PNA.    #COVID PNA  #Acute Hypoxic Resp failure  -CT of chest: patchy b/l groundglass infiltrates  -cont supplemental oxygen with oxymizer (at 10L)  -continuous pulse ox  -proning as tolerated  -day 4 of IV Decadron and IV Remdesivir   -cont Albuterol INH ATC, prn, Zinc, Guaifenesin, Vit D, Vit C  -elevated ferritin and elevated CRP 57, D-dimer 412-- follow inflammatory markers q72h  - Toci x 1 ordered 4/19/21  - Pulm on board    #DANIELLA vs CKD IIIa  - likely chronic but renal function improving  - continue to monitor  - avoid nephrotoxic agents  - consider renal US if worsening renal function    #mild Transaminitis  -secondary to Covid infection  -cont to monitor LFT's    #DVT ppx: lovenox    Updated SonKunal [phone] on plan of care.

## 2021-04-19 NOTE — PROGRESS NOTE ADULT - SUBJECTIVE AND OBJECTIVE BOX
Patient is a 45y old  Male who presents with a chief complaint of COVID 19 (18 Apr 2021 15:03)      Patient seen and examined at bedside.  No overnight events  No complaints this morning  On Oxymizer 6L - 92%    ALLERGIES:  No Known Allergies    MEDICATIONS  (STANDING):  ALBUTerol    90 MICROgram(s) HFA Inhaler 2 Puff(s) Inhalation every 6 hours  ascorbic acid 1000 milliGRAM(s) Oral two times a day  cholecalciferol 4000 Unit(s) Oral daily  dexAMETHasone  Injectable 6 milliGRAM(s) IV Push daily  enoxaparin Injectable 40 milliGRAM(s) SubCutaneous every 12 hours  famotidine    Tablet 20 milliGRAM(s) Oral daily  guaifenesin/dextromethorphan  Syrup 10 milliLiter(s) Oral every 6 hours  remdesivir  IVPB   IV Intermittent   remdesivir  IVPB 100 milliGRAM(s) IV Intermittent every 24 hours  zinc sulfate 220 milliGRAM(s) Oral daily    MEDICATIONS  (PRN):  acetaminophen   Tablet .. 650 milliGRAM(s) Oral every 6 hours PRN Temp greater or equal to 38C (100.4F), Mild Pain (1 - 3)  ondansetron Injectable 4 milliGRAM(s) IV Push every 8 hours PRN Nausea and/or Vomiting    Vital Signs Last 24 Hrs  T(F): 97.7 (19 Apr 2021 05:54), Max: 98.4 (18 Apr 2021 15:18)  HR: 81 (19 Apr 2021 05:54) (64 - 90)  BP: 108/71 (19 Apr 2021 05:54) (108/71 - 116/66)  RR: 18 (19 Apr 2021 05:54) (18 - 19)  SpO2: 91% (19 Apr 2021 05:54) (91% - 95%)  I&O's Summary    18 Apr 2021 07:01  -  19 Apr 2021 07:00  --------------------------------------------------------  IN: 0 mL / OUT: 350 mL / NET: -350 mL      BMI (kg/m2): 28.9 (04-16-21 @ 20:46), 30.4 (04-15-21 @ 08:11)    PHYSICAL EXAM:  GENERAL: NAD, on O2  HENT:  Atraumatic, Normocephalic; No tonsillar erythema, exudates, or enlargement; Moist mucous membranes;   EYES: EOMI, PERRLA, conjunctiva and sclera clear, no lid-lag  NECK: Supple, No JVD, Normal thyroid  NERVOUS SYSTEM:  CN II - XII intact; Sensation intact; Motor Strength 5/5 B/L upper and lower extremities  CHEST/LUNG: Clear to percussion bilaterally; No rales, rhonchi, wheezing, or rubs; normal respiratory effort, no intercostal retractions  HEART: Regular rate and rhythm; No murmurs, rubs, or gallops  ABDOMEN: Soft, Nontender, Nondistended; Bowel sounds present; No HSM  MUSCULOSKELETAL/EXTREMITIES:  2+ Peripheral Pulses, No clubbing, cyanosis, or peripheral edema; No digital cyanosis  SKIN: No rashes or lesions; normal texture and temperature  PSYCH: Appropriate affect, Alert & Oriented x 3    LABS:                        14.8   8.87  )-----------( 217      ( 19 Apr 2021 08:05 )             42.7       04-19    140  |  104  |  14  ----------------------------<  158  4.2   |  27  |  0.90    Ca    8.4      19 Apr 2021 08:05  Phos  3.4     04-19  Mg     2.3     04-19    TPro  6.9  /  Alb  2.8  /  TBili  0.6  /  DBili  x   /  AST  41  /  ALT  94  /  AlkPhos  61  04-19     eGFR if Non African American: 102 mL/min/1.73M2 (04-19-21 @ 08:05)  eGFR if African American: 118 mL/min/1.73M2 (04-19-21 @ 08:05)    PT/INR - ( 16 Apr 2021 21:30 )   PT: 14.7 sec;   INR: 1.23 ratio         PTT - ( 16 Apr 2021 21:30 )  PTT:28.2 sec   Lactate, Blood: 1.5 mmol/L (04-16 @ 21:30)    CARDIAC MARKERS ( 17 Apr 2021 05:10 )  <.017 ng/mL / x     / x     / x     / x        Culture - Urine (collected 15 Apr 2021 10:30)  Source: .Urine Clean Catch (Midstream)  Final Report (16 Apr 2021 11:35):    <10,000 CFU/mL Normal Urogenital Sofiya    Culture - Blood (collected 15 Apr 2021 08:20)  Source: .Blood Blood-Peripheral  Preliminary Report (16 Apr 2021 14:01):    No growth to date.    Culture - Blood (collected 15 Apr 2021 08:20)  Source: .Blood Blood-Peripheral  Preliminary Report (16 Apr 2021 14:01):    No growth to date.        Care Discussed with Consultants/Other Providers: Yes   Patient is a 45y old  Male who presents with a chief complaint of COVID 19 (18 Apr 2021 15:03)      Patient seen and examined at bedside.  No overnight events  No complaints this morning  On Oxymizer 6L - 92%  Increased oxygen to 10L at 95%. Toci x 1 ordered    ALLERGIES:  No Known Allergies    MEDICATIONS  (STANDING):  ALBUTerol    90 MICROgram(s) HFA Inhaler 2 Puff(s) Inhalation every 6 hours  ascorbic acid 1000 milliGRAM(s) Oral two times a day  cholecalciferol 4000 Unit(s) Oral daily  dexAMETHasone  Injectable 6 milliGRAM(s) IV Push daily  enoxaparin Injectable 40 milliGRAM(s) SubCutaneous every 12 hours  famotidine    Tablet 20 milliGRAM(s) Oral daily  guaifenesin/dextromethorphan  Syrup 10 milliLiter(s) Oral every 6 hours  remdesivir  IVPB   IV Intermittent   remdesivir  IVPB 100 milliGRAM(s) IV Intermittent every 24 hours  zinc sulfate 220 milliGRAM(s) Oral daily    MEDICATIONS  (PRN):  acetaminophen   Tablet .. 650 milliGRAM(s) Oral every 6 hours PRN Temp greater or equal to 38C (100.4F), Mild Pain (1 - 3)  ondansetron Injectable 4 milliGRAM(s) IV Push every 8 hours PRN Nausea and/or Vomiting    Vital Signs Last 24 Hrs  T(F): 97.7 (19 Apr 2021 05:54), Max: 98.4 (18 Apr 2021 15:18)  HR: 81 (19 Apr 2021 05:54) (64 - 90)  BP: 108/71 (19 Apr 2021 05:54) (108/71 - 116/66)  RR: 18 (19 Apr 2021 05:54) (18 - 19)  SpO2: 91% (19 Apr 2021 05:54) (91% - 95%)  I&O's Summary    18 Apr 2021 07:01  -  19 Apr 2021 07:00  --------------------------------------------------------  IN: 0 mL / OUT: 350 mL / NET: -350 mL      BMI (kg/m2): 28.9 (04-16-21 @ 20:46), 30.4 (04-15-21 @ 08:11)    PHYSICAL EXAM:  GENERAL: NAD, on O2  HENT:  Atraumatic, Normocephalic; No tonsillar erythema, exudates, or enlargement; Moist mucous membranes;   EYES: EOMI, PERRLA, conjunctiva and sclera clear, no lid-lag  NECK: Supple, No JVD, Normal thyroid  NERVOUS SYSTEM:  CN II - XII intact; Sensation intact; Motor Strength 5/5 B/L upper and lower extremities  CHEST/LUNG: Clear to percussion bilaterally; No rales, rhonchi, wheezing, or rubs; normal respiratory effort, no intercostal retractions  HEART: Regular rate and rhythm; No murmurs, rubs, or gallops  ABDOMEN: Soft, Nontender, Nondistended; Bowel sounds present; No HSM  MUSCULOSKELETAL/EXTREMITIES:  2+ Peripheral Pulses, No clubbing, cyanosis, or peripheral edema; No digital cyanosis  SKIN: No rashes or lesions; normal texture and temperature  PSYCH: Appropriate affect, Alert & Oriented x 3    LABS:                        14.8   8.87  )-----------( 217      ( 19 Apr 2021 08:05 )             42.7       04-19    140  |  104  |  14  ----------------------------<  158  4.2   |  27  |  0.90    Ca    8.4      19 Apr 2021 08:05  Phos  3.4     04-19  Mg     2.3     04-19    TPro  6.9  /  Alb  2.8  /  TBili  0.6  /  DBili  x   /  AST  41  /  ALT  94  /  AlkPhos  61  04-19     eGFR if Non African American: 102 mL/min/1.73M2 (04-19-21 @ 08:05)  eGFR if African American: 118 mL/min/1.73M2 (04-19-21 @ 08:05)    PT/INR - ( 16 Apr 2021 21:30 )   PT: 14.7 sec;   INR: 1.23 ratio         PTT - ( 16 Apr 2021 21:30 )  PTT:28.2 sec   Lactate, Blood: 1.5 mmol/L (04-16 @ 21:30)    CARDIAC MARKERS ( 17 Apr 2021 05:10 )  <.017 ng/mL / x     / x     / x     / x        Culture - Urine (collected 15 Apr 2021 10:30)  Source: .Urine Clean Catch (Midstream)  Final Report (16 Apr 2021 11:35):    <10,000 CFU/mL Normal Urogenital Sofiya    Culture - Blood (collected 15 Apr 2021 08:20)  Source: .Blood Blood-Peripheral  Preliminary Report (16 Apr 2021 14:01):    No growth to date.    Culture - Blood (collected 15 Apr 2021 08:20)  Source: .Blood Blood-Peripheral  Preliminary Report (16 Apr 2021 14:01):    No growth to date.        Care Discussed with Consultants/Other Providers: Yes   Patient is a 45y old  Male who presents with a chief complaint of COVID 19 (18 Apr 2021 15:03)      Patient seen and examined at bedside.  No overnight events  No complaints this morning  On Oxymizer 6L - 92%  Increased oxygen to 10L for 95% on pulse ox. Toci x 1 ordered as per pulm  Changed cough meds.      ALLERGIES:  No Known Allergies    MEDICATIONS  (STANDING):  ALBUTerol    90 MICROgram(s) HFA Inhaler 2 Puff(s) Inhalation every 6 hours  ascorbic acid 1000 milliGRAM(s) Oral two times a day  cholecalciferol 4000 Unit(s) Oral daily  dexAMETHasone  Injectable 6 milliGRAM(s) IV Push daily  enoxaparin Injectable 40 milliGRAM(s) SubCutaneous every 12 hours  famotidine    Tablet 20 milliGRAM(s) Oral daily  guaifenesin/dextromethorphan  Syrup 10 milliLiter(s) Oral every 6 hours  remdesivir  IVPB   IV Intermittent   remdesivir  IVPB 100 milliGRAM(s) IV Intermittent every 24 hours  zinc sulfate 220 milliGRAM(s) Oral daily    MEDICATIONS  (PRN):  acetaminophen   Tablet .. 650 milliGRAM(s) Oral every 6 hours PRN Temp greater or equal to 38C (100.4F), Mild Pain (1 - 3)  ondansetron Injectable 4 milliGRAM(s) IV Push every 8 hours PRN Nausea and/or Vomiting    Vital Signs Last 24 Hrs  T(F): 97.7 (19 Apr 2021 05:54), Max: 98.4 (18 Apr 2021 15:18)  HR: 81 (19 Apr 2021 05:54) (64 - 90)  BP: 108/71 (19 Apr 2021 05:54) (108/71 - 116/66)  RR: 18 (19 Apr 2021 05:54) (18 - 19)  SpO2: 91% (19 Apr 2021 05:54) (91% - 95%)  I&O's Summary    18 Apr 2021 07:01  -  19 Apr 2021 07:00  --------------------------------------------------------  IN: 0 mL / OUT: 350 mL / NET: -350 mL      BMI (kg/m2): 28.9 (04-16-21 @ 20:46), 30.4 (04-15-21 @ 08:11)    PHYSICAL EXAM:  GENERAL: NAD, on O2  HENT:  Atraumatic, Normocephalic; No tonsillar erythema, exudates, or enlargement; Moist mucous membranes;   EYES: EOMI, PERRLA, conjunctiva and sclera clear, no lid-lag  NECK: Supple, No JVD, Normal thyroid  NERVOUS SYSTEM:  CN II - XII intact; Sensation intact; Motor Strength 5/5 B/L upper and lower extremities  CHEST/LUNG: Clear to percussion bilaterally; No rales, rhonchi, wheezing, or rubs; normal respiratory effort, no intercostal retractions  HEART: Regular rate and rhythm; No murmurs, rubs, or gallops  ABDOMEN: Soft, Nontender, Nondistended; Bowel sounds present; No HSM  MUSCULOSKELETAL/EXTREMITIES:  2+ Peripheral Pulses, No clubbing, cyanosis, or peripheral edema; No digital cyanosis  SKIN: No rashes or lesions; normal texture and temperature  PSYCH: Appropriate affect, Alert & Oriented x 3    LABS:                        14.8   8.87  )-----------( 217      ( 19 Apr 2021 08:05 )             42.7       04-19    140  |  104  |  14  ----------------------------<  158  4.2   |  27  |  0.90    Ca    8.4      19 Apr 2021 08:05  Phos  3.4     04-19  Mg     2.3     04-19    TPro  6.9  /  Alb  2.8  /  TBili  0.6  /  DBili  x   /  AST  41  /  ALT  94  /  AlkPhos  61  04-19     eGFR if Non African American: 102 mL/min/1.73M2 (04-19-21 @ 08:05)  eGFR if African American: 118 mL/min/1.73M2 (04-19-21 @ 08:05)    PT/INR - ( 16 Apr 2021 21:30 )   PT: 14.7 sec;   INR: 1.23 ratio         PTT - ( 16 Apr 2021 21:30 )  PTT:28.2 sec   Lactate, Blood: 1.5 mmol/L (04-16 @ 21:30)    CARDIAC MARKERS ( 17 Apr 2021 05:10 )  <.017 ng/mL / x     / x     / x     / x        Culture - Urine (collected 15 Apr 2021 10:30)  Source: .Urine Clean Catch (Midstream)  Final Report (16 Apr 2021 11:35):    <10,000 CFU/mL Normal Urogenital Sofiya    Culture - Blood (collected 15 Apr 2021 08:20)  Source: .Blood Blood-Peripheral  Preliminary Report (16 Apr 2021 14:01):    No growth to date.    Culture - Blood (collected 15 Apr 2021 08:20)  Source: .Blood Blood-Peripheral  Preliminary Report (16 Apr 2021 14:01):    No growth to date.        Care Discussed with Consultants/Other Providers: Yes

## 2021-04-19 NOTE — PROGRESS NOTE ADULT - ASSESSMENT
Physical Examination:  GENERAL:               Alert, Oriented, No acute distress.    HEENT:                    No JVD, Moist MM  PULM:                     Bilateral air entry,  No Rales, No Rhonchi, No Wheezing  CVS:                         S1, S2,  No Murmur  ABD:                        Soft, nondistended, nontender, normoactive bowel sounds,   EXT:                         No edema, nontender, No Cyanosis or Clubbing   Vascular:                Warm Extremities,    SKIN:                       Warm and well perfused, no rashes noted.   NEURO:                  Alert, oriented, interactive, nonfocal, follows commands  PSYC:                      Calm, + Insight.    Assessment  1. COVID 19 PNA  2. Acute Hypoxic respiratory failure    Plan  -Worsening respiratory status with increasing oxygen requirement, will give Tociluzimab   - Continue Decadron 6mg for 10 day course, Remdisivir for 10 day course    - Monitor pulse renal and liver function  - Supplemental o2 to maintain sat > 90%    - Continuos Pulse ox monitoring   - Zinc/Vit C/Vit D  - Incentive spirometer  - Strict COVID 19 Isolation with airborne and contact isolation  - Cough suppression with Robitussin DM  - Trend biomarkers of inflammation  - DVT ppx  - GI PPX with Pepcid  - Repeat CXR      COVID 19 specific considerations and therapeutic options based on the available and rapidly changing literature

## 2021-04-19 NOTE — PROVIDER CONTACT NOTE (OTHER) - BACKGROUND
Patient has been periodically desaturating today when sitting in chair or when supine in bed. Oxygen increased earlier from 8L to 10L via oximyzer by Dr. Ann.

## 2021-04-19 NOTE — PROGRESS NOTE ADULT - SUBJECTIVE AND OBJECTIVE BOX
Follow-up Pulmonary Progress Note  Chief Complaint : Infection due to severe acute respiratory syndrome coronavirus 2 (SARS-CoV-2)      Worsening hypoxia, now on 10 lpm via NC. Though patient endorses he feels better since admission. NO chest pain or chest tightness endorsed. No fevers or chills reported.     Allergies :No Known Allergies      PAST MEDICAL & SURGICAL HISTORY:  Dyslipidemia    No significant past surgical history        Medications:  MEDICATIONS  (STANDING):  ALBUTerol    90 MICROgram(s) HFA Inhaler 2 Puff(s) Inhalation every 6 hours  ascorbic acid 1000 milliGRAM(s) Oral two times a day  benzonatate 100 milliGRAM(s) Oral every 8 hours  cholecalciferol 4000 Unit(s) Oral daily  dexAMETHasone  Injectable 6 milliGRAM(s) IV Push daily  enoxaparin Injectable 40 milliGRAM(s) SubCutaneous every 12 hours  famotidine    Tablet 20 milliGRAM(s) Oral daily  polyethylene glycol 3350 17 Gram(s) Oral two times a day  remdesivir  IVPB   IV Intermittent   remdesivir  IVPB 100 milliGRAM(s) IV Intermittent every 24 hours  senna 2 Tablet(s) Oral at bedtime  tocilizumab IVPB 600 milliGRAM(s) IV Intermittent once  zinc sulfate 220 milliGRAM(s) Oral daily    MEDICATIONS  (PRN):  acetaminophen   Tablet .. 650 milliGRAM(s) Oral every 6 hours PRN Temp greater or equal to 38C (100.4F), Mild Pain (1 - 3)  guaifenesin/dextromethorphan  Syrup 10 milliLiter(s) Oral every 6 hours PRN Cough  ondansetron Injectable 4 milliGRAM(s) IV Push every 8 hours PRN Nausea and/or Vomiting      LABS:                        14.8   8.87  )-----------( 217      ( 19 Apr 2021 08:05 )             42.7     04-19    140  |  104  |  14  ----------------------------<  158<H>  4.2   |  27  |  0.90    Ca    8.4      19 Apr 2021 08:05  Phos  3.4     04-19  Mg     2.3     04-19    TPro  6.9  /  Alb  2.8<L>  /  TBili  0.6  /  DBili  x   /  AST  41<H>  /  ALT  94<H>  /  AlkPhos  61  04-19    HIT ab -- 04-19 @ 08:05  HIT ab EIA --  D Dimer -317  HIT ab -- 04-16 @ 21:30  HIT ab EIA --  D Dimer -412              Procalcitonin, Serum: 0.12 ng/mL (04-16-21 @ 21:30)    Serum Pro-Brain Natriuretic Peptide: 65 pg/mL (04-17-21 @ 05:10)        CULTURES: (if applicable)    Culture - Urine (collected 04-15-21 @ 10:30)  Source: .Urine Clean Catch (Midstream)  Final Report (04-16-21 @ 11:35):    <10,000 CFU/mL Normal Urogenital Sofiya    Culture - Blood (collected 04-15-21 @ 08:20)  Source: .Blood Blood-Peripheral  Preliminary Report (04-16-21 @ 14:01):    No growth to date.    Culture - Blood (collected 04-15-21 @ 08:20)  Source: .Blood Blood-Peripheral  Preliminary Report (04-16-21 @ 14:01):    No growth to date.    VITALS:  T(C): 36.5 (04-19-21 @ 05:54), Max: 36.9 (04-18-21 @ 15:18)  T(F): 97.7 (04-19-21 @ 05:54), Max: 98.4 (04-18-21 @ 15:18)  HR: 81 (04-19-21 @ 05:54) (64 - 90)  BP: 108/71 (04-19-21 @ 05:54) (108/71 - 116/66)  BP(mean): --  ABP: --  ABP(mean): --  RR: 18 (04-19-21 @ 05:54) (18 - 19)  SpO2: 98% (04-19-21 @ 09:34) (91% - 98%)  CVP(mm Hg): --  CVP(cm H2O): --    Ins and Outs     04-18-21 @ 07:01  -  04-19-21 @ 07:00  --------------------------------------------------------  IN: 0 mL / OUT: 350 mL / NET: -350 mL        Height (cm): 172.7 (04-16-21 @ 20:46)  Weight (kg): 86.2 (04-16-21 @ 20:46)  BMI (kg/m2): 28.9 (04-16-21 @ 20:46)        I&O's Detail    18 Apr 2021 07:01  -  19 Apr 2021 07:00  --------------------------------------------------------  IN:  Total IN: 0 mL    OUT:    Voided (mL): 350 mL  Total OUT: 350 mL    Total NET: -350 mL

## 2021-04-20 LAB
ALBUMIN SERPL ELPH-MCNC: 2.9 G/DL — LOW (ref 3.3–5)
ALP SERPL-CCNC: 68 U/L — SIGNIFICANT CHANGE UP (ref 40–120)
ALT FLD-CCNC: 122 U/L — HIGH (ref 10–45)
ANION GAP SERPL CALC-SCNC: 6 MMOL/L — SIGNIFICANT CHANGE UP (ref 5–17)
AST SERPL-CCNC: 46 U/L — HIGH (ref 10–40)
BILIRUB SERPL-MCNC: 0.5 MG/DL — SIGNIFICANT CHANGE UP (ref 0.2–1.2)
BUN SERPL-MCNC: 17 MG/DL — SIGNIFICANT CHANGE UP (ref 7–23)
CALCIUM SERPL-MCNC: 9.4 MG/DL — SIGNIFICANT CHANGE UP (ref 8.4–10.5)
CHLORIDE SERPL-SCNC: 102 MMOL/L — SIGNIFICANT CHANGE UP (ref 96–108)
CO2 SERPL-SCNC: 29 MMOL/L — SIGNIFICANT CHANGE UP (ref 22–31)
CREAT SERPL-MCNC: 0.96 MG/DL — SIGNIFICANT CHANGE UP (ref 0.5–1.3)
CULTURE RESULTS: SIGNIFICANT CHANGE UP
CULTURE RESULTS: SIGNIFICANT CHANGE UP
GLUCOSE SERPL-MCNC: 148 MG/DL — HIGH (ref 70–99)
HCT VFR BLD CALC: 42.7 % — SIGNIFICANT CHANGE UP (ref 39–50)
HGB BLD-MCNC: 15.2 G/DL — SIGNIFICANT CHANGE UP (ref 13–17)
MCHC RBC-ENTMCNC: 29.7 PG — SIGNIFICANT CHANGE UP (ref 27–34)
MCHC RBC-ENTMCNC: 35.6 GM/DL — SIGNIFICANT CHANGE UP (ref 32–36)
MCV RBC AUTO: 83.4 FL — SIGNIFICANT CHANGE UP (ref 80–100)
NRBC # BLD: 0 /100 WBCS — SIGNIFICANT CHANGE UP (ref 0–0)
PLATELET # BLD AUTO: 252 K/UL — SIGNIFICANT CHANGE UP (ref 150–400)
POTASSIUM SERPL-MCNC: 4.3 MMOL/L — SIGNIFICANT CHANGE UP (ref 3.5–5.3)
POTASSIUM SERPL-SCNC: 4.3 MMOL/L — SIGNIFICANT CHANGE UP (ref 3.5–5.3)
PROT SERPL-MCNC: 6.9 G/DL — SIGNIFICANT CHANGE UP (ref 6–8.3)
RBC # BLD: 5.12 M/UL — SIGNIFICANT CHANGE UP (ref 4.2–5.8)
RBC # FLD: 12.1 % — SIGNIFICANT CHANGE UP (ref 10.3–14.5)
SODIUM SERPL-SCNC: 137 MMOL/L — SIGNIFICANT CHANGE UP (ref 135–145)
SPECIMEN SOURCE: SIGNIFICANT CHANGE UP
SPECIMEN SOURCE: SIGNIFICANT CHANGE UP
WBC # BLD: 9.29 K/UL — SIGNIFICANT CHANGE UP (ref 3.8–10.5)
WBC # FLD AUTO: 9.29 K/UL — SIGNIFICANT CHANGE UP (ref 3.8–10.5)

## 2021-04-20 PROCEDURE — 99233 SBSQ HOSP IP/OBS HIGH 50: CPT

## 2021-04-20 RX ADMIN — POLYETHYLENE GLYCOL 3350 17 GRAM(S): 17 POWDER, FOR SOLUTION ORAL at 17:28

## 2021-04-20 RX ADMIN — ALBUTEROL 2 PUFF(S): 90 AEROSOL, METERED ORAL at 09:05

## 2021-04-20 RX ADMIN — REMDESIVIR 500 MILLIGRAM(S): 5 INJECTION INTRAVENOUS at 22:52

## 2021-04-20 RX ADMIN — ENOXAPARIN SODIUM 40 MILLIGRAM(S): 100 INJECTION SUBCUTANEOUS at 05:12

## 2021-04-20 RX ADMIN — SENNA PLUS 2 TABLET(S): 8.6 TABLET ORAL at 21:03

## 2021-04-20 RX ADMIN — Medication 100 MILLIGRAM(S): at 13:55

## 2021-04-20 RX ADMIN — Medication 1000 MILLIGRAM(S): at 17:28

## 2021-04-20 RX ADMIN — Medication 100 MILLIGRAM(S): at 05:11

## 2021-04-20 RX ADMIN — ALBUTEROL 2 PUFF(S): 90 AEROSOL, METERED ORAL at 22:08

## 2021-04-20 RX ADMIN — ZINC SULFATE TAB 220 MG (50 MG ZINC EQUIVALENT) 220 MILLIGRAM(S): 220 (50 ZN) TAB at 11:43

## 2021-04-20 RX ADMIN — ENOXAPARIN SODIUM 40 MILLIGRAM(S): 100 INJECTION SUBCUTANEOUS at 17:28

## 2021-04-20 RX ADMIN — Medication 1000 MILLIGRAM(S): at 05:11

## 2021-04-20 RX ADMIN — Medication 6 MILLIGRAM(S): at 05:12

## 2021-04-20 RX ADMIN — ALBUTEROL 2 PUFF(S): 90 AEROSOL, METERED ORAL at 16:03

## 2021-04-20 RX ADMIN — Medication 650 MILLIGRAM(S): at 22:48

## 2021-04-20 RX ADMIN — Medication 4000 UNIT(S): at 11:43

## 2021-04-20 RX ADMIN — FAMOTIDINE 20 MILLIGRAM(S): 10 INJECTION INTRAVENOUS at 11:43

## 2021-04-20 RX ADMIN — Medication 100 MILLIGRAM(S): at 21:03

## 2021-04-20 RX ADMIN — POLYETHYLENE GLYCOL 3350 17 GRAM(S): 17 POWDER, FOR SOLUTION ORAL at 05:12

## 2021-04-20 RX ADMIN — Medication 650 MILLIGRAM(S): at 21:04

## 2021-04-20 NOTE — PROGRESS NOTE ADULT - SUBJECTIVE AND OBJECTIVE BOX
Patient is a 45y old  Male who presents with a chief complaint of COVID 19 (19 Apr 2021 13:47)      Patient seen and examined at bedside.  No overnight events  No complaints this morning  on 8L oxymizer at 95%    ALLERGIES:  No Known Allergies    MEDICATIONS  (STANDING):  ALBUTerol    90 MICROgram(s) HFA Inhaler 2 Puff(s) Inhalation every 6 hours  ascorbic acid 1000 milliGRAM(s) Oral two times a day  benzonatate 100 milliGRAM(s) Oral every 8 hours  cholecalciferol 4000 Unit(s) Oral daily  dexAMETHasone  Injectable 6 milliGRAM(s) IV Push daily  enoxaparin Injectable 40 milliGRAM(s) SubCutaneous every 12 hours  famotidine    Tablet 20 milliGRAM(s) Oral daily  polyethylene glycol 3350 17 Gram(s) Oral two times a day  remdesivir  IVPB   IV Intermittent   remdesivir  IVPB 100 milliGRAM(s) IV Intermittent every 24 hours  senna 2 Tablet(s) Oral at bedtime  zinc sulfate 220 milliGRAM(s) Oral daily    MEDICATIONS  (PRN):  acetaminophen   Tablet .. 650 milliGRAM(s) Oral every 6 hours PRN Temp greater or equal to 38C (100.4F), Mild Pain (1 - 3)  guaifenesin/dextromethorphan  Syrup 10 milliLiter(s) Oral every 6 hours PRN Cough  ondansetron Injectable 4 milliGRAM(s) IV Push every 8 hours PRN Nausea and/or Vomiting  sodium chloride 0.65% Nasal 1 Spray(s) Both Nostrils every 4 hours PRN Nasal Congestion    Vital Signs Last 24 Hrs  T(F): 98.2 (20 Apr 2021 07:42), Max: 98.4 (19 Apr 2021 20:24)  HR: 63 (20 Apr 2021 07:42) (63 - 91)  BP: 116/71 (20 Apr 2021 07:42) (111/68 - 127/82)  RR: 23 (20 Apr 2021 07:42) (18 - 27)  SpO2: 98% (20 Apr 2021 09:05) (90% - 98%)  I&O's Summary    19 Apr 2021 07:01  -  20 Apr 2021 07:00  --------------------------------------------------------  IN: 1140 mL / OUT: 0 mL / NET: 1140 mL    20 Apr 2021 07:01  -  20 Apr 2021 09:10  --------------------------------------------------------  IN: 218 mL / OUT: 200 mL / NET: 18 mL      BMI (kg/m2): 28.9 (04-16-21 @ 20:46)    PHYSICAL EXAM:  GENERAL: NAD  HENT:  Atraumatic, Normocephalic; No tonsillar erythema, exudates, or enlargement; Moist mucous membranes;   EYES: EOMI, PERRLA, conjunctiva and sclera clear, no lid-lag  NECK: Supple, No JVD, Normal thyroid  NERVOUS SYSTEM:  CN II - XII intact; Sensation intact; Motor Strength 5/5 B/L upper and lower extremities  CHEST/LUNG: Clear to percussion bilaterally; No rales, rhonchi, wheezing, or rubs; normal respiratory effort, no intercostal retractions  HEART: Regular rate and rhythm; No murmurs, rubs, or gallops  ABDOMEN: Soft, Nontender, Nondistended; Bowel sounds present; No HSM  MUSCULOSKELETAL/EXTREMITIES:  2+ Peripheral Pulses, No clubbing, cyanosis, or peripheral edema; No digital cyanosis  SKIN: No rashes or lesions; normal texture and temperature  PSYCH: Appropriate affect, Alert & Oriented x 3    LABS:                        15.2   9.29  )-----------( 252      ( 20 Apr 2021 06:20 )             42.7       04-20    137  |  102  |  17  ----------------------------<  148  4.3   |  29  |  0.96    Ca    9.4      20 Apr 2021 06:20  Phos  3.4     04-19  Mg     2.3     04-19    TPro  6.9  /  Alb  2.9  /  TBili  0.5  /  DBili  x   /  AST  46  /  ALT  122  /  AlkPhos  68  04-20     eGFR if Non African American: 94 mL/min/1.73M2 (04-20-21 @ 06:20)  eGFR if : 109 mL/min/1.73M2 (04-20-21 @ 06:20)      Culture - Urine (collected 15 Apr 2021 10:30)  Source: .Urine Clean Catch (Midstream)  Final Report (16 Apr 2021 11:35):    <10,000 CFU/mL Normal Urogenital Sofiya    Culture - Blood (collected 15 Apr 2021 08:20)  Source: .Blood Blood-Peripheral  Preliminary Report (16 Apr 2021 14:01):    No growth to date.    Culture - Blood (collected 15 Apr 2021 08:20)  Source: .Blood Blood-Peripheral  Preliminary Report (16 Apr 2021 14:01):    No growth to date.      Care Discussed with Consultants/Other Providers: Yes

## 2021-04-20 NOTE — PROGRESS NOTE ADULT - ASSESSMENT
45M no sigPMHx other than HLD admitted with acute respiratory failure with hypoxia sec COVID- 19 PNA.    #COVID PNA  #Acute Hypoxic Resp failure  -CT of chest: patchy b/l groundglass infiltrates  -cont supplemental oxygen with oxymizer (at 8L) > 90%  -continuous pulse ox  -proning as tolerated  -day 5 of IV Decadron and IV Remdesivir  (goal of 10 days)  -cont Albuterol INH ATC, prn, Zinc, Guaifenesin, Vit D, Vit C  -elevated ferritin and elevated CRP 57, D-dimer 412-- follow inflammatory markers q72h  - Toci x 1 given 4/19/21  - Pulm on board    #DANIELLA vs CKD IIIa  - likely chronic but renal function improving  - continue to monitor  - avoid nephrotoxic agents  - consider renal US if worsening renal function    #mild Transaminitis  -secondary to Covid infection  -cont to monitor LFT's    #DVT ppx: lovenox    Will update Kunal Balderas [phone] on plan of care. 45M no sigPMHx other than HLD admitted with acute respiratory failure with hypoxia sec COVID- 19 PNA.    #COVID PNA  #Acute Hypoxic Resp failure  -CT of chest: patchy b/l groundglass infiltrates  -cont supplemental oxygen with oxymizer (at 8L) > 90%  -continuous pulse ox  -proning as tolerated  -day 5 of IV Decadron and IV Remdesivir  (goal of 10 days)  -cont Albuterol INH ATC, prn, Zinc, Guaifenesin, Vit D, Vit C  -elevated ferritin and elevated CRP 57, D-dimer 412-- follow inflammatory markers q72h  - Toci x 1 given 4/19/21  - Pulm on board    #DANIELLA vs CKD IIIa  - likely chronic but renal function improving  - continue to monitor  - avoid nephrotoxic agents  - consider renal US if worsening renal function    #mild Transaminitis  -secondary to Covid infection  -cont to monitor LFT's    #DVT ppx: lovenox    Updated SonKunal [phone] on plan of care.

## 2021-04-20 NOTE — PROGRESS NOTE ADULT - SUBJECTIVE AND OBJECTIVE BOX
Follow-up Pulmonary Progress Note  Chief Complaint : Infection due to severe acute respiratory syndrome coronavirus 2 (SARS-CoV-2)    Seen at bedside, walking on 8lpm oxygen, saturating 90 - 91%. Denies any chest pain or shortness of breath. Comfortable other wise.     Allergies :No Known Allergies      PAST MEDICAL & SURGICAL HISTORY:  Dyslipidemia    No significant past surgical history        Medications:  MEDICATIONS  (STANDING):  ALBUTerol    90 MICROgram(s) HFA Inhaler 2 Puff(s) Inhalation every 6 hours  ascorbic acid 1000 milliGRAM(s) Oral two times a day  benzonatate 100 milliGRAM(s) Oral every 8 hours  cholecalciferol 4000 Unit(s) Oral daily  dexAMETHasone  Injectable 6 milliGRAM(s) IV Push daily  enoxaparin Injectable 40 milliGRAM(s) SubCutaneous every 12 hours  famotidine    Tablet 20 milliGRAM(s) Oral daily  polyethylene glycol 3350 17 Gram(s) Oral two times a day  remdesivir  IVPB   IV Intermittent   remdesivir  IVPB 100 milliGRAM(s) IV Intermittent every 24 hours  senna 2 Tablet(s) Oral at bedtime  zinc sulfate 220 milliGRAM(s) Oral daily    MEDICATIONS  (PRN):  acetaminophen   Tablet .. 650 milliGRAM(s) Oral every 6 hours PRN Temp greater or equal to 38C (100.4F), Mild Pain (1 - 3)  bisacodyl 10 milliGRAM(s) Oral daily PRN Constipation  guaifenesin/dextromethorphan  Syrup 10 milliLiter(s) Oral every 6 hours PRN Cough  ondansetron Injectable 4 milliGRAM(s) IV Push every 8 hours PRN Nausea and/or Vomiting  sodium chloride 0.65% Nasal 1 Spray(s) Both Nostrils every 4 hours PRN Nasal Congestion      LABS:                        15.2   9.29  )-----------( 252      ( 20 Apr 2021 06:20 )             42.7     04-20    137  |  102  |  17  ----------------------------<  148<H>  4.3   |  29  |  0.96    Ca    9.4      20 Apr 2021 06:20  Phos  3.4     04-19  Mg     2.3     04-19    TPro  6.9  /  Alb  2.9<L>  /  TBili  0.5  /  DBili  x   /  AST  46<H>  /  ALT  122<H>  /  AlkPhos  68  04-20    HIT ab -- 04-19 @ 08:05  HIT ab EIA --  D Dimer -317  HIT ab -- 04-16 @ 21:30  HIT ab EIA --  D Dimer -[id number]                      CULTURES: (if applicable)    Culture - Urine (collected 04-15-21 @ 10:30)  Source: .Urine Clean Catch (Midstream)  Final Report (04-16-21 @ 11:35):    <10,000 CFU/mL Normal Urogenital Sofiya    Culture - Blood (collected 04-15-21 @ 08:20)  Source: .Blood Blood-Peripheral  Preliminary Report (04-16-21 @ 14:01):    No growth to date.    Culture - Blood (collected 04-15-21 @ 08:20)  Source: .Blood Blood-Peripheral  Preliminary Report (04-16-21 @ 14:01):    No growth to date.            CAPILLARY BLOOD GLUCOSE          RADIOLOGY  CXR:      CT:    ECHO:      VITALS:  T(C): 36.8 (04-20-21 @ 07:42), Max: 36.9 (04-19-21 @ 20:24)  T(F): 98.2 (04-20-21 @ 07:42), Max: 98.4 (04-19-21 @ 20:24)  HR: 63 (04-20-21 @ 07:42) (63 - 91)  BP: 116/71 (04-20-21 @ 07:42) (111/68 - 127/82)  BP(mean): --  ABP: --  ABP(mean): --  RR: 23 (04-20-21 @ 07:42) (18 - 27)  SpO2: 98% (04-20-21 @ 09:05) (90% - 98%)  CVP(mm Hg): --  CVP(cm H2O): --    Ins and Outs     04-19-21 @ 07:01  -  04-20-21 @ 07:00  --------------------------------------------------------  IN: 1140 mL / OUT: 0 mL / NET: 1140 mL    04-20-21 @ 07:01  -  04-20-21 @ 13:55  --------------------------------------------------------  IN: 218 mL / OUT: 200 mL / NET: 18 mL                I&O's Detail    19 Apr 2021 07:01  -  20 Apr 2021 07:00  --------------------------------------------------------  IN:    IV PiggyBack: 100 mL    Oral Fluid: 1040 mL  Total IN: 1140 mL    OUT:  Total OUT: 0 mL    Total NET: 1140 mL      20 Apr 2021 07:01  -  20 Apr 2021 13:55  --------------------------------------------------------  IN:    Oral Fluid: 218 mL  Total IN: 218 mL    OUT:    Voided (mL): 200 mL  Total OUT: 200 mL    Total NET: 18 mL

## 2021-04-20 NOTE — CHART NOTE - NSCHARTNOTEFT_GEN_A_CORE
Nutrition Initial Assessment    Nutrition Consult Received: Yes [   ]  No [ X  ]    Reason for Initial Nutrition Assessment: length of stay    Source of Information: Unable to conduct a fact to face interview due to limited contact restrictions related to pt's medical condition and isolation precautions. Information obtained from a phone call with the pt and from the EMR.    Admitting Diagnosis: 45y Male admitted for Pneumonia due to COVID-19 virus      PAST MEDICAL & SURGICAL HISTORY:  Dyslipidemia    No significant past surgical history        Subjective Information:  unable to obtain hx. at present. eating well. tolerates regular diet well no n/v/ diarrhea noted. no dysphagia. no edema noted. skin intact. weight hx,unknown.      GI Issues: none    Current Nutrition Order: regular  PO Intake:   Good (%) [ X  ]    Fair (50-75%) [   ]    Poor (<50%) [   ]    Skin Integrity: WDL    Labs:   04-20 Na137 mmol/L Glu 148 mg/dL<H> K+ 4.3 mmol/L Cr  0.96 mg/dL BUN 17 mg/dL Phos n/a   Alb 2.9 g/dL<L> PAB n/a             Medications:  MEDICATIONS  (STANDING):  ALBUTerol    90 MICROgram(s) HFA Inhaler 2 Puff(s) Inhalation every 6 hours  ascorbic acid 1000 milliGRAM(s) Oral two times a day  benzonatate 100 milliGRAM(s) Oral every 8 hours  cholecalciferol 4000 Unit(s) Oral daily  dexAMETHasone  Injectable 6 milliGRAM(s) IV Push daily  enoxaparin Injectable 40 milliGRAM(s) SubCutaneous every 12 hours  famotidine    Tablet 20 milliGRAM(s) Oral daily  polyethylene glycol 3350 17 Gram(s) Oral two times a day  remdesivir  IVPB   IV Intermittent   remdesivir  IVPB 100 milliGRAM(s) IV Intermittent every 24 hours  senna 2 Tablet(s) Oral at bedtime  zinc sulfate 220 milliGRAM(s) Oral daily    MEDICATIONS  (PRN):  acetaminophen   Tablet .. 650 milliGRAM(s) Oral every 6 hours PRN Temp greater or equal to 38C (100.4F), Mild Pain (1 - 3)  guaifenesin/dextromethorphan  Syrup 10 milliLiter(s) Oral every 6 hours PRN Cough  ondansetron Injectable 4 milliGRAM(s) IV Push every 8 hours PRN Nausea and/or Vomiting  sodium chloride 0.65% Nasal 1 Spray(s) Both Nostrils every 4 hours PRN Nasal Congestion    Admitted Anthropometrics:    Height (cm): 172.7 (04-16-21 @ 20:46), 172.7 (04-15-21 @ 08:11)  Weight (kg): 86.2 (04-16-21 @ 20:46), 90.7 (04-15-21 @ 08:11)  BMI (kg/m2): 28.9 (04-16-21 @ 20:46), 30.4 (04-15-21 @ 08:11)    Nutrition Focused Physical Exam: Unable to complete due to limited isolation contact precautions at this time.     Estimated Energy Needs (_2064 kcal/kg- __24_ kcal/kg):   Estimated Protein Needs (__77 g/kg- _.9__ g/kg):   Based on weight of:    [  ] Nutrition Diagnosis:  [ X ] No active nutrition diagnosis at this time  [  ] Current medical condition precludes nutrition intervention    Goal: maintain adequate nutrition/hydration    Nutrition Interventions:     Recommendations:      RD to follow-up per protocol. Nutrition Initial Assessment    Nutrition Consult Received: Yes [   ]  No [ X  ]    Reason for Initial Nutrition Assessment: length of stay    Source of Information: Unable to conduct a fact to face interview due to limited contact restrictions related to pt's medical condition and isolation precautions. Information obtained from a phone call with the pt and from the EMR.    Admitting Diagnosis: 45y Male admitted for Pneumonia due to COVID-19 virus      PAST MEDICAL & SURGICAL HISTORY:  Dyslipidemia    No significant past surgical history        Subjective Information:  unable to obtain hx. at present. eating well. tolerates regular diet well no n/v/ diarrhea noted. no dysphagia. no edema noted. skin intact. weight hx,unknown. no bm for 3 days (received miralax yesterday)      GI Issues: none    Current Nutrition Order: regular  PO Intake:   Good (%) [ X  ]    Fair (50-75%) [   ]    Poor (<50%) [   ]    Skin Integrity: WDL    Labs:   04-20 Na137 mmol/L Glu 148 mg/dL<H> K+ 4.3 mmol/L Cr  0.96 mg/dL BUN 17 mg/dL Phos n/a   Alb 2.9 g/dL<L> PAB n/a             Medications:  MEDICATIONS  (STANDING):  ALBUTerol    90 MICROgram(s) HFA Inhaler 2 Puff(s) Inhalation every 6 hours  ascorbic acid 1000 milliGRAM(s) Oral two times a day  benzonatate 100 milliGRAM(s) Oral every 8 hours  cholecalciferol 4000 Unit(s) Oral daily  dexAMETHasone  Injectable 6 milliGRAM(s) IV Push daily  enoxaparin Injectable 40 milliGRAM(s) SubCutaneous every 12 hours  famotidine    Tablet 20 milliGRAM(s) Oral daily  polyethylene glycol 3350 17 Gram(s) Oral two times a day  remdesivir  IVPB   IV Intermittent   remdesivir  IVPB 100 milliGRAM(s) IV Intermittent every 24 hours  senna 2 Tablet(s) Oral at bedtime  zinc sulfate 220 milliGRAM(s) Oral daily    MEDICATIONS  (PRN):  acetaminophen   Tablet .. 650 milliGRAM(s) Oral every 6 hours PRN Temp greater or equal to 38C (100.4F), Mild Pain (1 - 3)  guaifenesin/dextromethorphan  Syrup 10 milliLiter(s) Oral every 6 hours PRN Cough  ondansetron Injectable 4 milliGRAM(s) IV Push every 8 hours PRN Nausea and/or Vomiting  sodium chloride 0.65% Nasal 1 Spray(s) Both Nostrils every 4 hours PRN Nasal Congestion    Admitted Anthropometrics:    Height (cm): 172.7 (04-16-21 @ 20:46), 172.7 (04-15-21 @ 08:11)  Weight (kg): 86.2 (04-16-21 @ 20:46), 90.7 (04-15-21 @ 08:11)  BMI (kg/m2): 28.9 (04-16-21 @ 20:46), 30.4 (04-15-21 @ 08:11)    Nutrition Focused Physical Exam: Unable to complete due to limited isolation contact precautions at this time.     Estimated Energy Needs (_2064 kcal/kg- __24_ kcal/kg):   Estimated Protein Needs (__77 g/kg- _.9__ g/kg):   Based on weight of:    [  ] Nutrition Diagnosis:  [ X ] No active nutrition diagnosis at this time  [  ] Current medical condition precludes nutrition intervention    Goal: maintain adequate nutrition/hydration    Nutrition Interventions:     Recommendations:      RD to follow-up per protocol.

## 2021-04-20 NOTE — PROGRESS NOTE ADULT - ASSESSMENT
Physical Examination:  GENERAL:               Alert, Oriented, No acute distress.    HEENT:                    No JVD, Moist MM  PULM:                     Bilateral air entry,  No Rales, No Rhonchi, No Wheezing  CVS:                         S1, S2,  No Murmur  ABD:                        Soft, nondistended, nontender, normoactive bowel sounds,   EXT:                         No edema, nontender, No Cyanosis or Clubbing   Vascular:                Warm Extremities,    SKIN:                       Warm and well perfused, no rashes noted.   NEURO:                  Alert, oriented, interactive, nonfocal, follows commands  PSYC:                      Calm, + Insight.    Assessment  1. COVID 19 PNA  2. Acute Hypoxic respiratory failure    Plan  -S/p Tocilizumab 4/19  - Continue Decadron 6mg for 10 day course, Remdisivir for 10 day course    - Monitor pulse renal and liver function  - Supplemental o2 to maintain sat > 90%    - Continuos Pulse ox monitoring   - Monitor for signs of bacterial infection  - Zinc/Vit C/Vit D  - Incentive spirometer  - Strict COVID 19 Isolation with airborne and contact isolation  - Cough suppression with Robitussin DM  - Trend biomarkers of inflammation  - DVT ppx  - GI PPX with Pepcid        COVID 19 specific considerations and therapeutic options based on the available and rapidly changing literature

## 2021-04-21 LAB
ALBUMIN SERPL ELPH-MCNC: 2.8 G/DL — LOW (ref 3.3–5)
ALP SERPL-CCNC: 63 U/L — SIGNIFICANT CHANGE UP (ref 40–120)
ALT FLD-CCNC: 101 U/L — HIGH (ref 10–45)
ANION GAP SERPL CALC-SCNC: 7 MMOL/L — SIGNIFICANT CHANGE UP (ref 5–17)
AST SERPL-CCNC: 34 U/L — SIGNIFICANT CHANGE UP (ref 10–40)
BILIRUB SERPL-MCNC: 0.6 MG/DL — SIGNIFICANT CHANGE UP (ref 0.2–1.2)
BUN SERPL-MCNC: 18 MG/DL — SIGNIFICANT CHANGE UP (ref 7–23)
CALCIUM SERPL-MCNC: 9.1 MG/DL — SIGNIFICANT CHANGE UP (ref 8.4–10.5)
CHLORIDE SERPL-SCNC: 103 MMOL/L — SIGNIFICANT CHANGE UP (ref 96–108)
CO2 SERPL-SCNC: 29 MMOL/L — SIGNIFICANT CHANGE UP (ref 22–31)
CREAT SERPL-MCNC: 1.03 MG/DL — SIGNIFICANT CHANGE UP (ref 0.5–1.3)
GLUCOSE BLDC GLUCOMTR-MCNC: 239 MG/DL — HIGH (ref 70–99)
GLUCOSE SERPL-MCNC: 142 MG/DL — HIGH (ref 70–99)
HCT VFR BLD CALC: 42.5 % — SIGNIFICANT CHANGE UP (ref 39–50)
HGB BLD-MCNC: 14.9 G/DL — SIGNIFICANT CHANGE UP (ref 13–17)
MCHC RBC-ENTMCNC: 29.6 PG — SIGNIFICANT CHANGE UP (ref 27–34)
MCHC RBC-ENTMCNC: 35.1 GM/DL — SIGNIFICANT CHANGE UP (ref 32–36)
MCV RBC AUTO: 84.3 FL — SIGNIFICANT CHANGE UP (ref 80–100)
NRBC # BLD: 0 /100 WBCS — SIGNIFICANT CHANGE UP (ref 0–0)
PLATELET # BLD AUTO: 280 K/UL — SIGNIFICANT CHANGE UP (ref 150–400)
POTASSIUM SERPL-MCNC: 3.9 MMOL/L — SIGNIFICANT CHANGE UP (ref 3.5–5.3)
POTASSIUM SERPL-SCNC: 3.9 MMOL/L — SIGNIFICANT CHANGE UP (ref 3.5–5.3)
PROT SERPL-MCNC: 6.5 G/DL — SIGNIFICANT CHANGE UP (ref 6–8.3)
RBC # BLD: 5.04 M/UL — SIGNIFICANT CHANGE UP (ref 4.2–5.8)
RBC # FLD: 12.2 % — SIGNIFICANT CHANGE UP (ref 10.3–14.5)
SODIUM SERPL-SCNC: 139 MMOL/L — SIGNIFICANT CHANGE UP (ref 135–145)
WBC # BLD: 10.92 K/UL — HIGH (ref 3.8–10.5)
WBC # FLD AUTO: 10.92 K/UL — HIGH (ref 3.8–10.5)

## 2021-04-21 PROCEDURE — 99233 SBSQ HOSP IP/OBS HIGH 50: CPT | Mod: GC

## 2021-04-21 RX ORDER — INSULIN LISPRO 100/ML
VIAL (ML) SUBCUTANEOUS
Refills: 0 | Status: DISCONTINUED | OUTPATIENT
Start: 2021-04-21 | End: 2021-04-24

## 2021-04-21 RX ORDER — POLYETHYLENE GLYCOL 3350 17 G/17G
17 POWDER, FOR SOLUTION ORAL
Refills: 0 | Status: DISCONTINUED | OUTPATIENT
Start: 2021-04-21 | End: 2021-04-24

## 2021-04-21 RX ADMIN — Medication 1000 MILLIGRAM(S): at 05:55

## 2021-04-21 RX ADMIN — Medication 100 MILLIGRAM(S): at 21:11

## 2021-04-21 RX ADMIN — ENOXAPARIN SODIUM 40 MILLIGRAM(S): 100 INJECTION SUBCUTANEOUS at 18:16

## 2021-04-21 RX ADMIN — REMDESIVIR 500 MILLIGRAM(S): 5 INJECTION INTRAVENOUS at 22:00

## 2021-04-21 RX ADMIN — ENOXAPARIN SODIUM 40 MILLIGRAM(S): 100 INJECTION SUBCUTANEOUS at 05:52

## 2021-04-21 RX ADMIN — Medication 10 MILLILITER(S): at 09:10

## 2021-04-21 RX ADMIN — Medication 1: at 17:12

## 2021-04-21 RX ADMIN — ALBUTEROL 2 PUFF(S): 90 AEROSOL, METERED ORAL at 08:49

## 2021-04-21 RX ADMIN — Medication 6 MILLIGRAM(S): at 05:52

## 2021-04-21 RX ADMIN — ALBUTEROL 2 PUFF(S): 90 AEROSOL, METERED ORAL at 16:01

## 2021-04-21 RX ADMIN — SENNA PLUS 2 TABLET(S): 8.6 TABLET ORAL at 21:11

## 2021-04-21 RX ADMIN — Medication 100 MILLIGRAM(S): at 05:52

## 2021-04-21 RX ADMIN — ZINC SULFATE TAB 220 MG (50 MG ZINC EQUIVALENT) 220 MILLIGRAM(S): 220 (50 ZN) TAB at 12:22

## 2021-04-21 RX ADMIN — Medication 1000 MILLIGRAM(S): at 18:16

## 2021-04-21 RX ADMIN — Medication 100 MILLIGRAM(S): at 15:09

## 2021-04-21 RX ADMIN — ALBUTEROL 2 PUFF(S): 90 AEROSOL, METERED ORAL at 21:49

## 2021-04-21 RX ADMIN — Medication 4000 UNIT(S): at 12:22

## 2021-04-21 RX ADMIN — FAMOTIDINE 20 MILLIGRAM(S): 10 INJECTION INTRAVENOUS at 12:22

## 2021-04-21 NOTE — PROGRESS NOTE ADULT - SUBJECTIVE AND OBJECTIVE BOX
Follow-up Pulmonary Progress Note  Chief Complaint : Infection due to severe acute respiratory syndrome coronavirus 2 (SARS-CoV-2)      Now on 6lpm via oximyzer pendant, ambulating at bedside. No fevers or chills reported. Comfortable no chest pain.     Allergies :No Known Allergies      PAST MEDICAL & SURGICAL HISTORY:  Dyslipidemia    No significant past surgical history        Medications:  MEDICATIONS  (STANDING):  ALBUTerol    90 MICROgram(s) HFA Inhaler 2 Puff(s) Inhalation every 6 hours  ascorbic acid 1000 milliGRAM(s) Oral two times a day  benzonatate 100 milliGRAM(s) Oral every 8 hours  cholecalciferol 4000 Unit(s) Oral daily  dexAMETHasone  Injectable 6 milliGRAM(s) IV Push daily  enoxaparin Injectable 40 milliGRAM(s) SubCutaneous every 12 hours  famotidine    Tablet 20 milliGRAM(s) Oral daily  insulin lispro (ADMELOG) corrective regimen sliding scale   SubCutaneous two times a day before meals  polyethylene glycol 3350 17 Gram(s) Oral two times a day  remdesivir  IVPB 100 milliGRAM(s) IV Intermittent every 24 hours  senna 2 Tablet(s) Oral at bedtime  zinc sulfate 220 milliGRAM(s) Oral daily    MEDICATIONS  (PRN):  acetaminophen   Tablet .. 650 milliGRAM(s) Oral every 6 hours PRN Temp greater or equal to 38C (100.4F), Mild Pain (1 - 3)  bisacodyl 10 milliGRAM(s) Oral daily PRN Constipation  guaifenesin/dextromethorphan  Syrup 10 milliLiter(s) Oral every 6 hours PRN Cough  ondansetron Injectable 4 milliGRAM(s) IV Push every 8 hours PRN Nausea and/or Vomiting  sodium chloride 0.65% Nasal 1 Spray(s) Both Nostrils every 4 hours PRN Nasal Congestion      LABS:                        14.9   10.92 )-----------( 280      ( 21 Apr 2021 05:45 )             42.5     04-21    139  |  103  |  18  ----------------------------<  142<H>  3.9   |  29  |  1.03    Ca    9.1      21 Apr 2021 05:45    TPro  6.5  /  Alb  2.8<L>  /  TBili  0.6  /  DBili  x   /  AST  34  /  ALT  101<H>  /  AlkPhos  63  04-21    HIT ab -- 04-19 @ 08:05  HIT ab EIA --  D Dimer -317  HIT ab -- 04-16 @ 21:30  HIT ab EIA --  D Dimer -412                      CULTURES: (if applicable)    Culture - Urine (collected 04-15-21 @ 10:30)  Source: .Urine Clean Catch (Midstream)  Final Report (04-16-21 @ 11:35):    <10,000 CFU/mL Normal Urogenital Sofiya    Culture - Blood (collected 04-15-21 @ 08:20)  Source: .Blood Blood-Peripheral  Final Report (04-20-21 @ 14:00):    No Growth Final    Culture - Blood (collected 04-15-21 @ 08:20)  Source: .Blood Blood-Peripheral  Final Report (04-20-21 @ 14:00):    No Growth Final            CAPILLARY BLOOD GLUCOSE          RADIOLOGY  CXR:      CT:    ECHO:      VITALS:  T(C): 36.3 (04-21-21 @ 08:09), Max: 36.8 (04-20-21 @ 15:56)  T(F): 97.3 (04-21-21 @ 08:09), Max: 98.3 (04-20-21 @ 15:56)  HR: 60 (04-21-21 @ 08:09) (55 - 73)  BP: 106/62 (04-21-21 @ 08:09) (106/62 - 121/68)  BP(mean): 77 (04-21-21 @ 08:09) (77 - 77)  ABP: --  ABP(mean): --  RR: 20 (04-21-21 @ 08:09) (15 - 20)  SpO2: 98% (04-21-21 @ 08:50) (93% - 98%)  CVP(mm Hg): --  CVP(cm H2O): --    Ins and Outs     04-20-21 @ 07:01  -  04-21-21 @ 07:00  --------------------------------------------------------  IN: 618 mL / OUT: 200 mL / NET: 418 mL                I&O's Detail    20 Apr 2021 07:01  -  21 Apr 2021 07:00  --------------------------------------------------------  IN:    Oral Fluid: 618 mL  Total IN: 618 mL    OUT:    Voided (mL): 200 mL  Total OUT: 200 mL    Total NET: 418 mL

## 2021-04-21 NOTE — PROGRESS NOTE ADULT - ASSESSMENT
Physical Examination:  GENERAL:               Alert, Oriented, No acute distress.    HEENT:                    No JVD, Moist MM  PULM:                     Bilateral air entry,  No Rales, No Rhonchi, No Wheezing  CVS:                         S1, S2,  No Murmur  ABD:                        Soft, nondistended, nontender, normoactive bowel sounds,   EXT:                         No edema, nontender, No Cyanosis or Clubbing   Vascular:                Warm Extremities,    SKIN:                       Warm and well perfused, no rashes noted.   NEURO:                  Alert, oriented, interactive, nonfocal, follows commands  PSYC:                      Calm, + Insight.    Assessment  1. COVID 19 PNA  2. Acute Hypoxic respiratory failure    Plan  -S/p Tocilizumab 4/19  - Continue Decadron 6mg for 10 day course, Remdisivir for 10 day course    - Monitor pulse renal and liver function  - Supplemental o2 to maintain sat > 90%, now on 6lpm via oxymizer pendant    - Continuos Pulse ox monitoring   - Monitor for signs of bacterial infection  - Zinc/Vit C/Vit D  - Incentive spirometer  - Strict COVID 19 Isolation with airborne and contact isolation  - Cough suppression with Robitussin DM  - Trend biomarkers of inflammation  - DVT ppx  - GI PPX with Pepcid        COVID 19 specific considerations and therapeutic options based on the available and rapidly changing literature

## 2021-04-21 NOTE — PROGRESS NOTE ADULT - SUBJECTIVE AND OBJECTIVE BOX
Patient is a 45y old  Male who presents with a chief complaint of COVID 19 (20 Apr 2021 13:54)      Patient seen and examined at bedside.  No overnight events  No complaints this morning  O2 decreased to 6L this AM at 93%    ALLERGIES:  No Known Allergies    MEDICATIONS  (STANDING):  ALBUTerol    90 MICROgram(s) HFA Inhaler 2 Puff(s) Inhalation every 6 hours  ascorbic acid 1000 milliGRAM(s) Oral two times a day  benzonatate 100 milliGRAM(s) Oral every 8 hours  cholecalciferol 4000 Unit(s) Oral daily  dexAMETHasone  Injectable 6 milliGRAM(s) IV Push daily  enoxaparin Injectable 40 milliGRAM(s) SubCutaneous every 12 hours  famotidine    Tablet 20 milliGRAM(s) Oral daily  polyethylene glycol 3350 17 Gram(s) Oral two times a day  remdesivir  IVPB 100 milliGRAM(s) IV Intermittent every 24 hours  senna 2 Tablet(s) Oral at bedtime  zinc sulfate 220 milliGRAM(s) Oral daily    MEDICATIONS  (PRN):  acetaminophen   Tablet .. 650 milliGRAM(s) Oral every 6 hours PRN Temp greater or equal to 38C (100.4F), Mild Pain (1 - 3)  bisacodyl 10 milliGRAM(s) Oral daily PRN Constipation  guaifenesin/dextromethorphan  Syrup 10 milliLiter(s) Oral every 6 hours PRN Cough  ondansetron Injectable 4 milliGRAM(s) IV Push every 8 hours PRN Nausea and/or Vomiting  sodium chloride 0.65% Nasal 1 Spray(s) Both Nostrils every 4 hours PRN Nasal Congestion    Vital Signs Last 24 Hrs  T(F): 97.3 (21 Apr 2021 08:09), Max: 98.3 (20 Apr 2021 15:56)  HR: 60 (21 Apr 2021 08:09) (55 - 73)  BP: 106/62 (21 Apr 2021 08:09) (106/62 - 121/68)  RR: 20 (21 Apr 2021 08:09) (15 - 20)  SpO2: 98% (21 Apr 2021 08:50) (93% - 98%)  I&O's Summary    20 Apr 2021 07:01  -  21 Apr 2021 07:00  --------------------------------------------------------  IN: 618 mL / OUT: 200 mL / NET: 418 mL      BMI (kg/m2): 28.9 (04-16-21 @ 20:46)    PHYSICAL EXAM:  GENERAL: NAD, on NC with oxymizer  HENT:  Atraumatic, Normocephalic; No tonsillar erythema, exudates, or enlargement; Moist mucous membranes;   EYES: EOMI, PERRLA, conjunctiva and sclera clear, no lid-lag  NECK: Supple, No JVD, Normal thyroid  NERVOUS SYSTEM:  CN II - XII intact; Sensation intact; Motor Strength 5/5 B/L upper and lower extremities  CHEST/LUNG: Clear to percussion bilaterally; No rales, rhonchi, wheezing, or rubs; normal respiratory effort, no intercostal retractions  HEART: Regular rate and rhythm; No murmurs, rubs, or gallops  ABDOMEN: Soft, Nontender, Nondistended; Bowel sounds present; No HSM  MUSCULOSKELETAL/EXTREMITIES:  2+ Peripheral Pulses, No clubbing, cyanosis, or peripheral edema; No digital cyanosis  SKIN: No rashes or lesions; normal texture and temperature  PSYCH: Appropriate affect, Alert & Oriented x 3    LABS:                        14.9   10.92 )-----------( 280      ( 21 Apr 2021 05:45 )             42.5       04-21    139  |  103  |  18  ----------------------------<  142  3.9   |  29  |  1.03    Ca    9.1      21 Apr 2021 05:45  Phos  3.4     04-19  Mg     2.3     04-19    TPro  6.5  /  Alb  2.8  /  TBili  0.6  /  DBili  x   /  AST  34  /  ALT  101  /  AlkPhos  63  04-21     eGFR if Non African American: 86 mL/min/1.73M2 (04-21-21 @ 05:45)  eGFR if African American: 100 mL/min/1.73M2 (04-21-21 @ 05:45)      Culture - Urine (collected 15 Apr 2021 10:30)  Source: .Urine Clean Catch (Midstream)  Final Report (16 Apr 2021 11:35):    <10,000 CFU/mL Normal Urogenital Sofiya    Culture - Blood (collected 15 Apr 2021 08:20)  Source: .Blood Blood-Peripheral  Final Report (20 Apr 2021 14:00):    No Growth Final    Culture - Blood (collected 15 Apr 2021 08:20)  Source: .Blood Blood-Peripheral  Final Report (20 Apr 2021 14:00):    No Growth Final        Care Discussed with Consultants/Other Providers: Yes

## 2021-04-21 NOTE — PROGRESS NOTE ADULT - ASSESSMENT
45M no sigPMHx other than HLD admitted with acute respiratory failure with hypoxia sec COVID- 19 PNA.    #COVID PNA  #Acute Hypoxic Resp failure  -CT of chest: patchy b/l groundglass infiltrates  -cont supplemental oxygen with oxymizer (at 6L) > 90%  -continuous pulse ox  -proning as tolerated  -day 6 of IV Decadron and IV Remdesivir  (goal of 10 days)  -cont Albuterol INH ATC, prn, Zinc, Guaifenesin, Vit D, Vit C  -elevated ferritin and elevated CRP 57, D-dimer 412-- follow inflammatory markers q72h  - Toci x 1 given 4/19/21  - Pulm on board  - ISS BID while on decadron    #DANIELLA vs CKD IIIa  - likely chronic but renal function improving  - continue to monitor  - avoid nephrotoxic agents  - consider renal US if worsening renal function    #mild Transaminitis  -secondary to Covid infection  -cont to monitor LFT's    #DVT ppx: lovenox    Updated SonKunal [phone] on plan of care.

## 2021-04-22 LAB
ALBUMIN SERPL ELPH-MCNC: 2.8 G/DL — LOW (ref 3.3–5)
ALP SERPL-CCNC: 60 U/L — SIGNIFICANT CHANGE UP (ref 40–120)
ALT FLD-CCNC: 115 U/L — HIGH (ref 10–45)
ANION GAP SERPL CALC-SCNC: 12 MMOL/L — SIGNIFICANT CHANGE UP (ref 5–17)
AST SERPL-CCNC: 35 U/L — SIGNIFICANT CHANGE UP (ref 10–40)
BILIRUB SERPL-MCNC: 0.5 MG/DL — SIGNIFICANT CHANGE UP (ref 0.2–1.2)
BUN SERPL-MCNC: 15 MG/DL — SIGNIFICANT CHANGE UP (ref 7–23)
CALCIUM SERPL-MCNC: 8.5 MG/DL — SIGNIFICANT CHANGE UP (ref 8.4–10.5)
CHLORIDE SERPL-SCNC: 104 MMOL/L — SIGNIFICANT CHANGE UP (ref 96–108)
CO2 SERPL-SCNC: 26 MMOL/L — SIGNIFICANT CHANGE UP (ref 22–31)
CREAT SERPL-MCNC: 1.05 MG/DL — SIGNIFICANT CHANGE UP (ref 0.5–1.3)
CRP SERPL-MCNC: 9 MG/L — HIGH
D DIMER BLD IA.RAPID-MCNC: 260 NG/ML DDU — HIGH
FERRITIN SERPL-MCNC: 1426 NG/ML — HIGH (ref 30–400)
GLUCOSE BLDC GLUCOMTR-MCNC: 142 MG/DL — HIGH (ref 70–99)
GLUCOSE BLDC GLUCOMTR-MCNC: 240 MG/DL — HIGH (ref 70–99)
GLUCOSE BLDC GLUCOMTR-MCNC: 273 MG/DL — HIGH (ref 70–99)
GLUCOSE SERPL-MCNC: 119 MG/DL — HIGH (ref 70–99)
HCT VFR BLD CALC: 44.2 % — SIGNIFICANT CHANGE UP (ref 39–50)
HGB BLD-MCNC: 15.5 G/DL — SIGNIFICANT CHANGE UP (ref 13–17)
MCHC RBC-ENTMCNC: 29.8 PG — SIGNIFICANT CHANGE UP (ref 27–34)
MCHC RBC-ENTMCNC: 35.1 GM/DL — SIGNIFICANT CHANGE UP (ref 32–36)
MCV RBC AUTO: 85 FL — SIGNIFICANT CHANGE UP (ref 80–100)
NRBC # BLD: 0 /100 WBCS — SIGNIFICANT CHANGE UP (ref 0–0)
PLATELET # BLD AUTO: 285 K/UL — SIGNIFICANT CHANGE UP (ref 150–400)
POTASSIUM SERPL-MCNC: 4.2 MMOL/L — SIGNIFICANT CHANGE UP (ref 3.5–5.3)
POTASSIUM SERPL-SCNC: 4.2 MMOL/L — SIGNIFICANT CHANGE UP (ref 3.5–5.3)
PROT SERPL-MCNC: 6.5 G/DL — SIGNIFICANT CHANGE UP (ref 6–8.3)
RBC # BLD: 5.2 M/UL — SIGNIFICANT CHANGE UP (ref 4.2–5.8)
RBC # FLD: 12 % — SIGNIFICANT CHANGE UP (ref 10.3–14.5)
SODIUM SERPL-SCNC: 142 MMOL/L — SIGNIFICANT CHANGE UP (ref 135–145)
WBC # BLD: 11.3 K/UL — HIGH (ref 3.8–10.5)
WBC # FLD AUTO: 11.3 K/UL — HIGH (ref 3.8–10.5)

## 2021-04-22 PROCEDURE — 99233 SBSQ HOSP IP/OBS HIGH 50: CPT

## 2021-04-22 RX ADMIN — Medication 100 MILLIGRAM(S): at 13:15

## 2021-04-22 RX ADMIN — Medication 4000 UNIT(S): at 13:17

## 2021-04-22 RX ADMIN — Medication 1: at 17:19

## 2021-04-22 RX ADMIN — Medication 1000 MILLIGRAM(S): at 06:20

## 2021-04-22 RX ADMIN — FAMOTIDINE 20 MILLIGRAM(S): 10 INJECTION INTRAVENOUS at 13:15

## 2021-04-22 RX ADMIN — Medication 100 MILLIGRAM(S): at 06:20

## 2021-04-22 RX ADMIN — ALBUTEROL 2 PUFF(S): 90 AEROSOL, METERED ORAL at 08:50

## 2021-04-22 RX ADMIN — Medication 100 MILLIGRAM(S): at 21:25

## 2021-04-22 RX ADMIN — ZINC SULFATE TAB 220 MG (50 MG ZINC EQUIVALENT) 220 MILLIGRAM(S): 220 (50 ZN) TAB at 13:15

## 2021-04-22 RX ADMIN — ENOXAPARIN SODIUM 40 MILLIGRAM(S): 100 INJECTION SUBCUTANEOUS at 06:20

## 2021-04-22 RX ADMIN — ENOXAPARIN SODIUM 40 MILLIGRAM(S): 100 INJECTION SUBCUTANEOUS at 18:19

## 2021-04-22 RX ADMIN — Medication 1000 MILLIGRAM(S): at 18:19

## 2021-04-22 RX ADMIN — ALBUTEROL 2 PUFF(S): 90 AEROSOL, METERED ORAL at 15:58

## 2021-04-22 RX ADMIN — ALBUTEROL 2 PUFF(S): 90 AEROSOL, METERED ORAL at 21:55

## 2021-04-22 RX ADMIN — Medication 6 MILLIGRAM(S): at 06:20

## 2021-04-22 NOTE — PROGRESS NOTE ADULT - ASSESSMENT
45M no sigPMHx other than HLD admitted with acute respiratory failure with hypoxia sec COVID- 19 PNA.    #COVID PNA  #Acute Hypoxic Resp failure  -CT of chest: patchy b/l groundglass infiltrates  -cont supplemental oxygen with oxymizer (at 6L) > 90%  -continuous pulse ox  -proning as tolerated  -day 7 of IV Decadron and IV Remdesivir  (goal of 10 days)  -cont Albuterol INH ATC, prn, Zinc, Guaifenesin, Vit D, Vit C  -elevated ferritin and elevated CRP 57, D-dimer 412-- follow inflammatory markers q72h  - Toci x 1 given 4/19/21  - Pulm on board  - ISS BID while on decadron    #CKD II  - likely chronic CKD  - continue to monitor  - avoid nephrotoxic agents    #mild Transaminitis  -secondary to Covid infection  -cont to monitor LFT's    #DVT ppx: lovenox    attempted to call Kunal Balderas [phone] on plan of care, no response. left a phone message. 4/22 45M no sigPMHx other than HLD admitted with acute respiratory failure with hypoxia sec COVID- 19 PNA.    #COVID PNA  #Acute Hypoxic Resp failure  -CT of chest: patchy b/l groundglass infiltrates  -cont supplemental oxygen with oxymizer (at 6L) > 90%  -continuous pulse ox  -proning as tolerated  -day 6 of IV Decadron and IV Remdesivir  (goal of 10 days)  -cont Albuterol INH ATC, prn, Zinc, Guaifenesin, Vit D, Vit C  -elevated ferritin and elevated CRP 57, D-dimer 412-- follow inflammatory markers q72h  - Toci x 1 given 4/19/21  - Pulm on board  - ISS BID while on decadron    #CKD II  - likely chronic CKD  - continue to monitor  - avoid nephrotoxic agents    #mild Transaminitis  -secondary to Covid infection  -cont to monitor LFT's    #DVT ppx: lovenox    attempted to call Kunal Balderas [phone] on plan of care, no response. left a phone message. 4/22

## 2021-04-22 NOTE — PROGRESS NOTE ADULT - SUBJECTIVE AND OBJECTIVE BOX
Patient is a 45y old  Male who presents with a chief complaint of COVID 19 (22 Apr 2021 09:24)      BRIEF HOSPITAL COURSE:   45M no sigPMHx other than HLD admitted with acute respiratory failure with hypoxia 2/2 COVID PNA.    Interval Events:  -Pt c/o dry nose/irritation 2/2 Oxymizer (satting 94% on 4L) --> actively weaned to NC at 3L satting 96%, more comfortable.   -S/p Toci on 4/19 as per Pulm/CC recommendations.  -No other acute complaints at this time.     Oxygen Support:  -On bedside assessment, pt satting 96% on 3L NC - will continue to actively wean/titrate as tolerated.     PAST MEDICAL & SURGICAL HISTORY:  Dyslipidemia    No significant past surgical history        Review of Systems:  CONSTITUTIONAL: No fever, chills, or fatigue  EYES: No eye pain, visual disturbances, or discharge  ENMT:  No difficulty hearing, tinnitus, vertigo; No sinus or throat pain  NECK: No pain or stiffness  RESPIRATORY: No cough, wheezing, chills or hemoptysis; No shortness of breath  CARDIOVASCULAR: No chest pain, palpitations, dizziness, or leg swelling  GASTROINTESTINAL: No abdominal or epigastric pain. No nausea, vomiting, or hematemesis; No diarrhea or constipation. No melena or hematochezia.  GENITOURINARY: No dysuria, frequency, hematuria, or incontinence  NEUROLOGICAL: No headaches, memory loss, loss of strength, numbness, or tremors  SKIN: No itching, burning, rashes, or lesions   MUSCULOSKELETAL: No joint pain or swelling; No muscle, back, or extremity pain  PSYCHIATRIC: No depression, anxiety, mood swings, or difficulty sleeping      Medications:  remdesivir  IVPB 100 milliGRAM(s) IV Intermittent every 24 hours      ALBUTerol    90 MICROgram(s) HFA Inhaler 2 Puff(s) Inhalation every 6 hours  benzonatate 100 milliGRAM(s) Oral every 8 hours  guaifenesin/dextromethorphan  Syrup 10 milliLiter(s) Oral every 6 hours PRN    acetaminophen   Tablet .. 650 milliGRAM(s) Oral every 6 hours PRN  ondansetron Injectable 4 milliGRAM(s) IV Push every 8 hours PRN      enoxaparin Injectable 40 milliGRAM(s) SubCutaneous every 12 hours    bisacodyl 10 milliGRAM(s) Oral daily PRN  famotidine    Tablet 20 milliGRAM(s) Oral daily  polyethylene glycol 3350 17 Gram(s) Oral two times a day PRN  senna 2 Tablet(s) Oral at bedtime      dexAMETHasone  Injectable 6 milliGRAM(s) IV Push daily  insulin lispro (ADMELOG) corrective regimen sliding scale   SubCutaneous two times a day before meals    ascorbic acid 1000 milliGRAM(s) Oral two times a day  cholecalciferol 4000 Unit(s) Oral daily  zinc sulfate 220 milliGRAM(s) Oral daily      sodium chloride 0.65% Nasal 1 Spray(s) Both Nostrils every 4 hours PRN            ICU Vital Signs Last 24 Hrs  T(C): 36.8 (22 Apr 2021 21:30), Max: 36.8 (22 Apr 2021 15:57)  T(F): 98.3 (22 Apr 2021 21:30), Max: 98.3 (22 Apr 2021 21:30)  HR: 69 (22 Apr 2021 21:30) (69 - 98)  BP: 107/70 (22 Apr 2021 21:30) (104/65 - 107/70)  BP(mean): --  ABP: --  ABP(mean): --  RR: 20 (22 Apr 2021 21:47) (18 - 21)  SpO2: 96% (22 Apr 2021 21:47) (92% - 96%)          I&O's Detail    21 Apr 2021 07:01  -  22 Apr 2021 07:00  --------------------------------------------------------  IN:    Oral Fluid: 1240 mL  Total IN: 1240 mL    OUT:    Voided (mL): 440 mL  Total OUT: 440 mL    Total NET: 800 mL      22 Apr 2021 07:01  -  22 Apr 2021 22:07  --------------------------------------------------------  IN:    Oral Fluid: 1500 mL  Total IN: 1500 mL    OUT:  Total OUT: 0 mL    Total NET: 1500 mL            LABS:                        15.5   11.30 )-----------( 285      ( 22 Apr 2021 07:16 )             44.2     04-22    142  |  104  |  15  ----------------------------<  119<H>  4.2   |  26  |  1.05    Ca    8.5      22 Apr 2021 07:16    TPro  6.5  /  Alb  2.8<L>  /  TBili  0.5  /  DBili  x   /  AST  35  /  ALT  115<H>  /  AlkPhos  60  04-22          CAPILLARY BLOOD GLUCOSE      POCT Blood Glucose.: 240 mg/dL (22 Apr 2021 17:16)        CULTURES:      Physical Examination:    PULM: Clear to auscultation bilaterally, non-labored breathing.     CVS: s1/s2.     ABD: Soft, nondistended, nontender, normoactive bowel sounds.    EXT: No edema, nontender    NEURO: awake, alert.       RADIOLOGY:   < from: Xray Chest 1 View- PORTABLE-Urgent (04.16.21 @ 21:34) >  EXAM:  XR CHEST PORTABLE URGENT 1V    PROCEDURE DATE:  04/16/2021    INTERPRETATION:  Portable chest radiograph  CLINICAL INFORMATION: Dyspnea, shortness of breath.  TECHNIQUE:  Portable  AP view of the chest was obtained.  COMPARISON: 4/15/2021 chest available for review.  FINDINGS:  The lungs show increasing bilateral  multifocal and diffuse ill-defined airspace opacities. No pneumothorax.  The heart and mediastinum are within normal limits.  Visualized osseous structures are intact.  IMPRESSION:   Increasing bilateral  multifocal and diffuse ill-defined airspace opacities.  GENA MORENO MD; Attending Radiologist  This document has been electronically signed. Apr 17 2021 11:24AM      < from: CT Abdomen and Pelvis w/ IV Cont (04.15.21 @ 13:45) >  EXAM:  CT ABDOMEN AND PELVIS IC    PROCEDURE DATE:  04/15/2021    INTERPRETATION:  CLINICAL INFORMATION: 45 years  Male with acute abdominal pain covid +.  COMPARISON: None.  CONTRAST/COMPLICATIONS:  IV Contrast: Omnipaque 350  90 cc administered   10 cc discarded  Oral Contrast: NONE  Complications: None reported at time of study completion  PROCEDURE:  CT of the Abdomen and Pelvis was performed.  Sagittal and coronal reformats were performed.  FINDINGS:  LOWER CHEST: Patchy bilateral lower lobe groundglass infiltrates.  LIVER: 5 mm left lobe hypodensity too small to characterize. Right lobe 20.3 cm.  BILE DUCTS: Normal caliber.  GALLBLADDER: Within normal limits.  SPLEEN: Within normal limits.  PANCREAS: Within normal limits.  ADRENALS: Within normal limits.  KIDNEYS/URETERS: 1 cm left upper pole cyst. 5 mm right midpole hypodensity too small to characterize.  BLADDER: Within normal limits.  REPRODUCTIVE ORGANS: Unremarkable prostate.  BOWEL: No bowel obstruction. Appendix is normal. Mild fecal burden throughout the colon.  PERITONEUM: No ascites.  VESSELS: Within normal limits.  RETROPERITONEUM/LYMPH NODES: No lymphadenopathy.  ABDOMINAL WALL: Within normal limits.  BONES: Within normal limits.  IMPRESSION:  Bilateral infiltrates, likely Covid pneumonia.  Mild constipation.  Mild hepatomegaly.  ROBINA SÁNCHEZ MD; Attending Radiologist  This document has been electronically signed. Apr 15 2021  1:51PM        Lab/radiology studies/ABG/Meds reviewed and interpreted into the assessment and treatment plan.  Assessment/Plan/Therapeutic interventions      Neuro:   -Awake and alert.  -No current Issues.      Cor:    -Currently HD stable.   -Maintain MAP 65-70.      Pulm:   -Patient is admitted with COVID-19 PNA and is currently on 3L NC for oxygen support (weaned from 4L Oxymizer 2/2 nares irritation) - will continue to actively wean/titrate O2 support for goal SaO2 >88%.  -Monitor respiratory status closely w/ continuous pulse ox.   -Albuterol q6h. Benzonatate q8h, Robitussin PRN.   -Pulmonary toilet, encourage incentive spirometer use, prone positioning encouraged as tolerated.  -Continue w/ Decadron, Remdesivir; S/p Toci on 4/19.  -Pulm/CC following.   -Vitamin C, Vitamin D, Zinc.   -Nasal spray PRN for congestion.      GI:    -Patient on regular diet.  -Pepcid QD.   -Zofran PRN.  -Continue bowel regimen.  -Transaminitis likely 2/2 COVID -trend pulse liver function.       Renal:   -Renal function currently WNL.  -Trend lytes/Scr daily with BMP.  -I's and O's, goal UOP 0.5 cc/kg/hr  -Renal dose meds and avoid nephrotoxins.      Heme:    -Pharmacologic DVT Prophylaxis w/ Lovenox.       ID:   -ABX use and/or discontinuation based on discussion with ID in conjunction with clinical features, culture data, and judicious procalcitonin monitoring.   -Trend inflammatory markers, including COVID19 labs.  -Tylenol PRN for fever.  -Maintain strict isolation precautions w/ proper PPE.      Endo: Aggressive glycemic control to limit FS glucose to <180mg/dl. Insulin sliding scale before meals.     COVID19 specific considerations and therapeutic options based on the available and rapidly changing literature  Goals of care considerations: Ongoing assessment for patient specific treatment options based on progression or decline.  I have involved the family with updates and requests in guidance for medical decision making.    37 Minutes spent in the management of this COVID-19 Patient / PUI patient with continuous assessments and interventions based on the interpretation of multiple databases.  Patient is a 45y old  Male who presents with a chief complaint of COVID 19 (22 Apr 2021 09:24)      BRIEF HOSPITAL COURSE:   45M no sigPMHx other than HLD admitted with acute respiratory failure with hypoxia 2/2 COVID PNA.    Interval Events:  -Pt c/o dry nose/irritation 2/2 Oxymizer (satting 94% on 4L) --> actively weaned to NC at 3L satting 96%, now more comfortable.   -S/p Toci on 4/19 as per Pulm/CC recommendations.  -No other acute complaints at this time.     Oxygen Support:  -On bedside assessment, pt satting 96% on 3L NC - will continue to actively wean/titrate as tolerated.     PAST MEDICAL & SURGICAL HISTORY:  Dyslipidemia    No significant past surgical history        Review of Systems:  CONSTITUTIONAL: No fever, chills, or fatigue  EYES: No eye pain, visual disturbances, or discharge  ENMT:  No difficulty hearing, tinnitus, vertigo; No sinus or throat pain  NECK: No pain or stiffness  RESPIRATORY: No cough, wheezing, chills or hemoptysis; No shortness of breath  CARDIOVASCULAR: No chest pain, palpitations, dizziness, or leg swelling  GASTROINTESTINAL: No abdominal or epigastric pain. No nausea, vomiting, or hematemesis; No diarrhea or constipation. No melena or hematochezia.  GENITOURINARY: No dysuria, frequency, hematuria, or incontinence  NEUROLOGICAL: No headaches, memory loss, loss of strength, numbness, or tremors  SKIN: No itching, burning, rashes, or lesions   MUSCULOSKELETAL: No joint pain or swelling; No muscle, back, or extremity pain  PSYCHIATRIC: No depression, anxiety, mood swings, or difficulty sleeping      Medications:  remdesivir  IVPB 100 milliGRAM(s) IV Intermittent every 24 hours      ALBUTerol    90 MICROgram(s) HFA Inhaler 2 Puff(s) Inhalation every 6 hours  benzonatate 100 milliGRAM(s) Oral every 8 hours  guaifenesin/dextromethorphan  Syrup 10 milliLiter(s) Oral every 6 hours PRN    acetaminophen   Tablet .. 650 milliGRAM(s) Oral every 6 hours PRN  ondansetron Injectable 4 milliGRAM(s) IV Push every 8 hours PRN      enoxaparin Injectable 40 milliGRAM(s) SubCutaneous every 12 hours    bisacodyl 10 milliGRAM(s) Oral daily PRN  famotidine    Tablet 20 milliGRAM(s) Oral daily  polyethylene glycol 3350 17 Gram(s) Oral two times a day PRN  senna 2 Tablet(s) Oral at bedtime      dexAMETHasone  Injectable 6 milliGRAM(s) IV Push daily  insulin lispro (ADMELOG) corrective regimen sliding scale   SubCutaneous two times a day before meals    ascorbic acid 1000 milliGRAM(s) Oral two times a day  cholecalciferol 4000 Unit(s) Oral daily  zinc sulfate 220 milliGRAM(s) Oral daily      sodium chloride 0.65% Nasal 1 Spray(s) Both Nostrils every 4 hours PRN            ICU Vital Signs Last 24 Hrs  T(C): 36.8 (22 Apr 2021 21:30), Max: 36.8 (22 Apr 2021 15:57)  T(F): 98.3 (22 Apr 2021 21:30), Max: 98.3 (22 Apr 2021 21:30)  HR: 69 (22 Apr 2021 21:30) (69 - 98)  BP: 107/70 (22 Apr 2021 21:30) (104/65 - 107/70)  BP(mean): --  ABP: --  ABP(mean): --  RR: 20 (22 Apr 2021 21:47) (18 - 21)  SpO2: 96% (22 Apr 2021 21:47) (92% - 96%)          I&O's Detail    21 Apr 2021 07:01  -  22 Apr 2021 07:00  --------------------------------------------------------  IN:    Oral Fluid: 1240 mL  Total IN: 1240 mL    OUT:    Voided (mL): 440 mL  Total OUT: 440 mL    Total NET: 800 mL      22 Apr 2021 07:01  -  22 Apr 2021 22:07  --------------------------------------------------------  IN:    Oral Fluid: 1500 mL  Total IN: 1500 mL    OUT:  Total OUT: 0 mL    Total NET: 1500 mL            LABS:                        15.5   11.30 )-----------( 285      ( 22 Apr 2021 07:16 )             44.2     04-22    142  |  104  |  15  ----------------------------<  119<H>  4.2   |  26  |  1.05    Ca    8.5      22 Apr 2021 07:16    TPro  6.5  /  Alb  2.8<L>  /  TBili  0.5  /  DBili  x   /  AST  35  /  ALT  115<H>  /  AlkPhos  60  04-22          CAPILLARY BLOOD GLUCOSE      POCT Blood Glucose.: 240 mg/dL (22 Apr 2021 17:16)        CULTURES:      Physical Examination:    PULM: Clear to auscultation bilaterally, non-labored breathing.     CVS: s1/s2.     ABD: Soft, nondistended, nontender, normoactive bowel sounds.    EXT: No edema, nontender    NEURO: awake, alert.       RADIOLOGY:   < from: Xray Chest 1 View- PORTABLE-Urgent (04.16.21 @ 21:34) >  EXAM:  XR CHEST PORTABLE URGENT 1V    PROCEDURE DATE:  04/16/2021    INTERPRETATION:  Portable chest radiograph  CLINICAL INFORMATION: Dyspnea, shortness of breath.  TECHNIQUE:  Portable  AP view of the chest was obtained.  COMPARISON: 4/15/2021 chest available for review.  FINDINGS:  The lungs show increasing bilateral  multifocal and diffuse ill-defined airspace opacities. No pneumothorax.  The heart and mediastinum are within normal limits.  Visualized osseous structures are intact.  IMPRESSION:   Increasing bilateral  multifocal and diffuse ill-defined airspace opacities.  GENA MORENO MD; Attending Radiologist  This document has been electronically signed. Apr 17 2021 11:24AM      < from: CT Abdomen and Pelvis w/ IV Cont (04.15.21 @ 13:45) >  EXAM:  CT ABDOMEN AND PELVIS IC    PROCEDURE DATE:  04/15/2021    INTERPRETATION:  CLINICAL INFORMATION: 45 years  Male with acute abdominal pain covid +.  COMPARISON: None.  CONTRAST/COMPLICATIONS:  IV Contrast: Omnipaque 350  90 cc administered   10 cc discarded  Oral Contrast: NONE  Complications: None reported at time of study completion  PROCEDURE:  CT of the Abdomen and Pelvis was performed.  Sagittal and coronal reformats were performed.  FINDINGS:  LOWER CHEST: Patchy bilateral lower lobe groundglass infiltrates.  LIVER: 5 mm left lobe hypodensity too small to characterize. Right lobe 20.3 cm.  BILE DUCTS: Normal caliber.  GALLBLADDER: Within normal limits.  SPLEEN: Within normal limits.  PANCREAS: Within normal limits.  ADRENALS: Within normal limits.  KIDNEYS/URETERS: 1 cm left upper pole cyst. 5 mm right midpole hypodensity too small to characterize.  BLADDER: Within normal limits.  REPRODUCTIVE ORGANS: Unremarkable prostate.  BOWEL: No bowel obstruction. Appendix is normal. Mild fecal burden throughout the colon.  PERITONEUM: No ascites.  VESSELS: Within normal limits.  RETROPERITONEUM/LYMPH NODES: No lymphadenopathy.  ABDOMINAL WALL: Within normal limits.  BONES: Within normal limits.  IMPRESSION:  Bilateral infiltrates, likely Covid pneumonia.  Mild constipation.  Mild hepatomegaly.  ROBINA SÁNCHEZ MD; Attending Radiologist  This document has been electronically signed. Apr 15 2021  1:51PM        Lab/radiology studies/ABG/Meds reviewed and interpreted into the assessment and treatment plan.  Assessment/Plan/Therapeutic interventions      Neuro:   -Awake and alert.  -No current Issues.      Cor:    -Currently HD stable.   -Maintain MAP 65-70.      Pulm:   -Patient is admitted with COVID-19 PNA and is currently on 3L NC for oxygen support (weaned from 4L Oxymizer 2/2 nares irritation) - will continue to actively wean/titrate O2 support for goal SaO2 >88%.  -Monitor respiratory status closely w/ continuous pulse ox.   -Albuterol q6h. Benzonatate q8h, Robitussin PRN.   -Pulmonary toilet, encourage incentive spirometer use, prone positioning encouraged as tolerated.  -Continue w/ Decadron, Remdesivir; S/p Toci on 4/19.  -Pulm/CC following.   -Vitamin C, Vitamin D, Zinc.   -Nasal spray PRN for congestion.  -Ordered repeat CXR for AM.       GI:    -Patient on regular diet.  -Pepcid QD.   -Zofran PRN.  -Continue bowel regimen.  -Transaminitis likely 2/2 COVID -trend pulse liver function.       Renal:   -Renal function currently WNL.  -Trend lytes/Scr daily with BMP.  -I's and O's, goal UOP 0.5 cc/kg/hr  -Renal dose meds and avoid nephrotoxins.      Heme:    -Pharmacologic DVT Prophylaxis w/ Lovenox.       ID:   -ABX use and/or discontinuation based on discussion with ID in conjunction with clinical features, culture data, and judicious procalcitonin monitoring.   -Trend inflammatory markers, including COVID19 labs.  -Tylenol PRN for fever.  -Maintain strict isolation precautions w/ proper PPE.      Endo: Aggressive glycemic control to limit FS glucose to <180mg/dl. Insulin sliding scale before meals.     COVID19 specific considerations and therapeutic options based on the available and rapidly changing literature  Goals of care considerations: Ongoing assessment for patient specific treatment options based on progression or decline.  I have involved the family with updates and requests in guidance for medical decision making.    37 Minutes spent in the management of this COVID-19 Patient / PUI patient with continuous assessments and interventions based on the interpretation of multiple databases.

## 2021-04-22 NOTE — PROGRESS NOTE ADULT - SUBJECTIVE AND OBJECTIVE BOX
Patient is a 45y old  Male who presents with a chief complaint of COVID 19 (21 Apr 2021 12:10)      Patient seen and examined at bedside. pt reports SOB is stable. minimal coughing. no CP. + dry nose    ALLERGIES:  No Known Allergies    MEDICATIONS  (STANDING):  ALBUTerol    90 MICROgram(s) HFA Inhaler 2 Puff(s) Inhalation every 6 hours  ascorbic acid 1000 milliGRAM(s) Oral two times a day  benzonatate 100 milliGRAM(s) Oral every 8 hours  cholecalciferol 4000 Unit(s) Oral daily  dexAMETHasone  Injectable 6 milliGRAM(s) IV Push daily  enoxaparin Injectable 40 milliGRAM(s) SubCutaneous every 12 hours  famotidine    Tablet 20 milliGRAM(s) Oral daily  insulin lispro (ADMELOG) corrective regimen sliding scale   SubCutaneous two times a day before meals  remdesivir  IVPB 100 milliGRAM(s) IV Intermittent every 24 hours  senna 2 Tablet(s) Oral at bedtime  zinc sulfate 220 milliGRAM(s) Oral daily    MEDICATIONS  (PRN):  acetaminophen   Tablet .. 650 milliGRAM(s) Oral every 6 hours PRN Temp greater or equal to 38C (100.4F), Mild Pain (1 - 3)  bisacodyl 10 milliGRAM(s) Oral daily PRN Constipation  guaifenesin/dextromethorphan  Syrup 10 milliLiter(s) Oral every 6 hours PRN Cough  ondansetron Injectable 4 milliGRAM(s) IV Push every 8 hours PRN Nausea and/or Vomiting  polyethylene glycol 3350 17 Gram(s) Oral two times a day PRN Constipation  sodium chloride 0.65% Nasal 1 Spray(s) Both Nostrils every 4 hours PRN Nasal Congestion    Vital Signs Last 24 Hrs  T(F): 98.2 (21 Apr 2021 16:28), Max: 98.2 (21 Apr 2021 16:28)  HR: 70 (21 Apr 2021 22:31) (70 - 73)  BP: 110/67 (21 Apr 2021 16:28) (110/67 - 110/67)  RR: 20 (21 Apr 2021 22:31) (20 - 23)  SpO2: 94% (21 Apr 2021 22:31) (93% - 98%)  I&O's Summary    21 Apr 2021 07:01  -  22 Apr 2021 07:00  --------------------------------------------------------  IN: 1240 mL / OUT: 440 mL / NET: 800 mL      PHYSICAL EXAM:  General: NAD, A/O x 3  ENT: MMM  Neck: Supple, No JVD  Lungs: Clear to auscultation bilaterally  Cardio: RRR, S1/S2, No murmurs  Abdomen: Soft, Nontender, Nondistended; Bowel sounds present  Extremities: No calf tenderness, No pitting edema    LABS:                        15.5   11.30 )-----------( 285      ( 22 Apr 2021 07:16 )             44.2     04-22    142  |  104  |  15  ----------------------------<  119  4.2   |  26  |  1.05    Ca    8.5      22 Apr 2021 07:16    TPro  6.5  /  Alb  2.8  /  TBili  0.5  /  DBili  x   /  AST  35  /  ALT  115  /  AlkPhos  60  04-22    eGFR if Non African American: 85 mL/min/1.73M2 (04-22-21 @ 07:16)  eGFR if : 98 mL/min/1.73M2 (04-22-21 @ 07:16)                POCT Blood Glucose.: 142 mg/dL (22 Apr 2021 08:24)  POCT Blood Glucose.: 239 mg/dL (21 Apr 2021 17:05)          Culture - Urine (collected 15 Apr 2021 10:30)  Source: .Urine Clean Catch (Midstream)  Final Report (16 Apr 2021 11:35):    <10,000 CFU/mL Normal Urogenital Sofiya        RADIOLOGY & ADDITIONAL TESTS:      < from: Xray Chest 1 View- PORTABLE-Urgent (04.16.21 @ 21:34) >    IMPRESSION:   Increasing bilateral  multifocal and diffuse ill-defined airspace opacities.    < end of copied text >    Care Discussed with Consultants/Other Providers: pulmonary   Patient is a 45y old  Male who presents with a chief complaint of COVID 19 (21 Apr 2021 12:10)      Patient seen and examined at bedside. pt reports SOB is stable. minimal coughing. no CP. + dry nose    ALLERGIES:  No Known Allergies    MEDICATIONS  (STANDING):  ALBUTerol    90 MICROgram(s) HFA Inhaler 2 Puff(s) Inhalation every 6 hours  ascorbic acid 1000 milliGRAM(s) Oral two times a day  benzonatate 100 milliGRAM(s) Oral every 8 hours  cholecalciferol 4000 Unit(s) Oral daily  dexAMETHasone  Injectable 6 milliGRAM(s) IV Push daily  enoxaparin Injectable 40 milliGRAM(s) SubCutaneous every 12 hours  famotidine    Tablet 20 milliGRAM(s) Oral daily  insulin lispro (ADMELOG) corrective regimen sliding scale   SubCutaneous two times a day before meals  remdesivir  IVPB 100 milliGRAM(s) IV Intermittent every 24 hours  senna 2 Tablet(s) Oral at bedtime  zinc sulfate 220 milliGRAM(s) Oral daily    MEDICATIONS  (PRN):  acetaminophen   Tablet .. 650 milliGRAM(s) Oral every 6 hours PRN Temp greater or equal to 38C (100.4F), Mild Pain (1 - 3)  bisacodyl 10 milliGRAM(s) Oral daily PRN Constipation  guaifenesin/dextromethorphan  Syrup 10 milliLiter(s) Oral every 6 hours PRN Cough  ondansetron Injectable 4 milliGRAM(s) IV Push every 8 hours PRN Nausea and/or Vomiting  polyethylene glycol 3350 17 Gram(s) Oral two times a day PRN Constipation  sodium chloride 0.65% Nasal 1 Spray(s) Both Nostrils every 4 hours PRN Nasal Congestion    Vital Signs Last 24 Hrs  T(F): 98.2 (21 Apr 2021 16:28), Max: 98.2 (21 Apr 2021 16:28)  HR: 70 (21 Apr 2021 22:31) (70 - 73)  BP: 110/67 (21 Apr 2021 16:28) (110/67 - 110/67)  RR: 20 (21 Apr 2021 22:31) (20 - 23)  SpO2: 94% (21 Apr 2021 22:31) (93% - 98%)  I&O's Summary    21 Apr 2021 07:01  -  22 Apr 2021 07:00  --------------------------------------------------------  IN: 1240 mL / OUT: 440 mL / NET: 800 mL      PHYSICAL EXAM:  General: NAD, A/O x 3  ENT: MMM  Neck: Supple, No JVD  Lungs: Clear to auscultation bilaterally  Cardio: RRR, S1/S2, No murmurs  Abdomen: Soft, Nontender, Nondistended; Bowel sounds present  Extremities: No calf tenderness, No pitting edema    LABS:                        15.5   11.30 )-----------( 285      ( 22 Apr 2021 07:16 )             44.2     04-22    142  |  104  |  15  ----------------------------<  119  4.2   |  26  |  1.05    Ca    8.5      22 Apr 2021 07:16    TPro  6.5  /  Alb  2.8  /  TBili  0.5  /  DBili  x   /  AST  35  /  ALT  115  /  AlkPhos  60  04-22    eGFR if Non African American: 85 mL/min/1.73M2 (04-22-21 @ 07:16)  eGFR if : 98 mL/min/1.73M2 (04-22-21 @ 07:16)        POCT Blood Glucose.: 142 mg/dL (22 Apr 2021 08:24)  POCT Blood Glucose.: 239 mg/dL (21 Apr 2021 17:05)      Culture - Urine (collected 15 Apr 2021 10:30)  Source: .Urine Clean Catch (Midstream)  Final Report (16 Apr 2021 11:35):    <10,000 CFU/mL Normal Urogenital Sofiya        RADIOLOGY & ADDITIONAL TESTS:      < from: Xray Chest 1 View- PORTABLE-Urgent (04.16.21 @ 21:34) >    IMPRESSION:   Increasing bilateral  multifocal and diffuse ill-defined airspace opacities.    < end of copied text >    Care Discussed with Consultants/Other Providers: pulmonary

## 2021-04-23 ENCOUNTER — TRANSCRIPTION ENCOUNTER (OUTPATIENT)
Age: 46
End: 2021-04-23

## 2021-04-23 LAB
ALBUMIN SERPL ELPH-MCNC: 2.7 G/DL — LOW (ref 3.3–5)
ALP SERPL-CCNC: 63 U/L — SIGNIFICANT CHANGE UP (ref 40–120)
ALT FLD-CCNC: 104 U/L — HIGH (ref 10–45)
ANION GAP SERPL CALC-SCNC: 9 MMOL/L — SIGNIFICANT CHANGE UP (ref 5–17)
AST SERPL-CCNC: 31 U/L — SIGNIFICANT CHANGE UP (ref 10–40)
BILIRUB SERPL-MCNC: 0.4 MG/DL — SIGNIFICANT CHANGE UP (ref 0.2–1.2)
BUN SERPL-MCNC: 15 MG/DL — SIGNIFICANT CHANGE UP (ref 7–23)
CALCIUM SERPL-MCNC: 8.9 MG/DL — SIGNIFICANT CHANGE UP (ref 8.4–10.5)
CHLORIDE SERPL-SCNC: 102 MMOL/L — SIGNIFICANT CHANGE UP (ref 96–108)
CO2 SERPL-SCNC: 27 MMOL/L — SIGNIFICANT CHANGE UP (ref 22–31)
CREAT SERPL-MCNC: 0.92 MG/DL — SIGNIFICANT CHANGE UP (ref 0.5–1.3)
GLUCOSE BLDC GLUCOMTR-MCNC: 149 MG/DL — HIGH (ref 70–99)
GLUCOSE BLDC GLUCOMTR-MCNC: 262 MG/DL — HIGH (ref 70–99)
GLUCOSE SERPL-MCNC: 167 MG/DL — HIGH (ref 70–99)
HCT VFR BLD CALC: 43 % — SIGNIFICANT CHANGE UP (ref 39–50)
HGB BLD-MCNC: 14.9 G/DL — SIGNIFICANT CHANGE UP (ref 13–17)
LYMPHOCYTES # BLD AUTO: 15 % — SIGNIFICANT CHANGE UP (ref 13–44)
MAGNESIUM SERPL-MCNC: 2.3 MG/DL — SIGNIFICANT CHANGE UP (ref 1.6–2.6)
MCHC RBC-ENTMCNC: 29.7 PG — SIGNIFICANT CHANGE UP (ref 27–34)
MCHC RBC-ENTMCNC: 34.7 GM/DL — SIGNIFICANT CHANGE UP (ref 32–36)
MCV RBC AUTO: 85.7 FL — SIGNIFICANT CHANGE UP (ref 80–100)
MONOCYTES NFR BLD AUTO: 6 % — SIGNIFICANT CHANGE UP (ref 2–14)
MYELOCYTES NFR BLD: 2 % — HIGH (ref 0–0)
NEUTROPHILS NFR BLD AUTO: 75 % — SIGNIFICANT CHANGE UP (ref 43–77)
NEUTS BAND # BLD: 2 % — SIGNIFICANT CHANGE UP (ref 0–8)
PHOSPHATE SERPL-MCNC: 3.9 MG/DL — SIGNIFICANT CHANGE UP (ref 2.5–4.5)
PLAT MORPH BLD: NORMAL — SIGNIFICANT CHANGE UP
PLATELET # BLD AUTO: 291 K/UL — SIGNIFICANT CHANGE UP (ref 150–400)
POTASSIUM SERPL-MCNC: 3.8 MMOL/L — SIGNIFICANT CHANGE UP (ref 3.5–5.3)
POTASSIUM SERPL-SCNC: 3.8 MMOL/L — SIGNIFICANT CHANGE UP (ref 3.5–5.3)
PROT SERPL-MCNC: 6.2 G/DL — SIGNIFICANT CHANGE UP (ref 6–8.3)
RBC # BLD: 5.02 M/UL — SIGNIFICANT CHANGE UP (ref 4.2–5.8)
RBC # FLD: 12.1 % — SIGNIFICANT CHANGE UP (ref 10.3–14.5)
RBC BLD AUTO: NORMAL — SIGNIFICANT CHANGE UP
SODIUM SERPL-SCNC: 138 MMOL/L — SIGNIFICANT CHANGE UP (ref 135–145)
WBC # BLD: 14.25 K/UL — HIGH (ref 3.8–10.5)
WBC # FLD AUTO: 14.25 K/UL — HIGH (ref 3.8–10.5)

## 2021-04-23 PROCEDURE — 71045 X-RAY EXAM CHEST 1 VIEW: CPT | Mod: 26

## 2021-04-23 PROCEDURE — 99233 SBSQ HOSP IP/OBS HIGH 50: CPT

## 2021-04-23 RX ADMIN — Medication 100 MILLIGRAM(S): at 05:56

## 2021-04-23 RX ADMIN — REMDESIVIR 500 MILLIGRAM(S): 5 INJECTION INTRAVENOUS at 02:51

## 2021-04-23 RX ADMIN — Medication 4000 UNIT(S): at 12:32

## 2021-04-23 RX ADMIN — FAMOTIDINE 20 MILLIGRAM(S): 10 INJECTION INTRAVENOUS at 12:32

## 2021-04-23 RX ADMIN — ZINC SULFATE TAB 220 MG (50 MG ZINC EQUIVALENT) 220 MILLIGRAM(S): 220 (50 ZN) TAB at 12:32

## 2021-04-23 RX ADMIN — ALBUTEROL 2 PUFF(S): 90 AEROSOL, METERED ORAL at 09:14

## 2021-04-23 RX ADMIN — ALBUTEROL 2 PUFF(S): 90 AEROSOL, METERED ORAL at 15:55

## 2021-04-23 RX ADMIN — ENOXAPARIN SODIUM 40 MILLIGRAM(S): 100 INJECTION SUBCUTANEOUS at 17:01

## 2021-04-23 RX ADMIN — Medication 6 MILLIGRAM(S): at 05:56

## 2021-04-23 RX ADMIN — ALBUTEROL 2 PUFF(S): 90 AEROSOL, METERED ORAL at 21:40

## 2021-04-23 RX ADMIN — Medication 100 MILLIGRAM(S): at 12:58

## 2021-04-23 RX ADMIN — ALBUTEROL 2 PUFF(S): 90 AEROSOL, METERED ORAL at 04:32

## 2021-04-23 RX ADMIN — ENOXAPARIN SODIUM 40 MILLIGRAM(S): 100 INJECTION SUBCUTANEOUS at 05:56

## 2021-04-23 RX ADMIN — Medication 2: at 17:03

## 2021-04-23 RX ADMIN — Medication 100 MILLIGRAM(S): at 21:14

## 2021-04-23 RX ADMIN — Medication 1000 MILLIGRAM(S): at 05:56

## 2021-04-23 RX ADMIN — Medication 1000 MILLIGRAM(S): at 17:01

## 2021-04-23 NOTE — DISCHARGE NOTE NURSING/CASE MANAGEMENT/SOCIAL WORK - PATIENT PORTAL LINK FT
You can access the FollowMyHealth Patient Portal offered by [facility] by registering at the following website: [email or web address]. By joining [employer]’s FollowMyHealth portal, you will also be able to view your health information using other applications (apps) compatible with our system.

## 2021-04-23 NOTE — PROGRESS NOTE ADULT - SUBJECTIVE AND OBJECTIVE BOX
Follow-up Pulmonary Progress Note  Chief Complaint : Infection due to severe acute respiratory syndrome coronavirus 2 (SARS-CoV-2)        patient feeling well  no cp, sob, palp, n/v  on 3 l n/c doing well      Allergies :No Known Allergies      PAST MEDICAL & SURGICAL HISTORY:  Dyslipidemia    No significant past surgical history        Medications:  MEDICATIONS  (STANDING):  ALBUTerol    90 MICROgram(s) HFA Inhaler 2 Puff(s) Inhalation every 6 hours  ascorbic acid 1000 milliGRAM(s) Oral two times a day  benzonatate 100 milliGRAM(s) Oral every 8 hours  cholecalciferol 4000 Unit(s) Oral daily  dexAMETHasone  Injectable 6 milliGRAM(s) IV Push daily  enoxaparin Injectable 40 milliGRAM(s) SubCutaneous every 12 hours  famotidine    Tablet 20 milliGRAM(s) Oral daily  insulin lispro (ADMELOG) corrective regimen sliding scale   SubCutaneous two times a day before meals  remdesivir  IVPB 100 milliGRAM(s) IV Intermittent every 24 hours  senna 2 Tablet(s) Oral at bedtime  zinc sulfate 220 milliGRAM(s) Oral daily    MEDICATIONS  (PRN):  acetaminophen   Tablet .. 650 milliGRAM(s) Oral every 6 hours PRN Temp greater or equal to 38C (100.4F), Mild Pain (1 - 3)  bisacodyl 10 milliGRAM(s) Oral daily PRN Constipation  guaifenesin/dextromethorphan  Syrup 10 milliLiter(s) Oral every 6 hours PRN Cough  ondansetron Injectable 4 milliGRAM(s) IV Push every 8 hours PRN Nausea and/or Vomiting  polyethylene glycol 3350 17 Gram(s) Oral two times a day PRN Constipation  sodium chloride 0.65% Nasal 1 Spray(s) Both Nostrils every 4 hours PRN Nasal Congestion      LABS:                        14.9   14.25 )-----------( 291      ( 23 Apr 2021 06:28 )             43.0     04-23    138  |  102  |  15  ----------------------------<  167<H>  3.8   |  27  |  0.92    Ca    8.9      23 Apr 2021 06:28  Phos  3.9     04-23  Mg     2.3     04-23    TPro  6.2  /  Alb  2.7<L>  /  TBili  0.4  /  DBili  x   /  AST  31  /  ALT  104<H>  /  AlkPhos  63  04-23         CULTURES: (if applicable)    Culture - Urine (collected 04-15-21 @ 10:30)  Source: .Urine Clean Catch (Midstream)  Final Report (04-16-21 @ 11:35):    <10,000 CFU/mL Normal Urogenital Sofiya    Culture - Blood (collected 04-15-21 @ 08:20)  Source: .Blood Blood-Peripheral  Final Report (04-20-21 @ 14:00):    No Growth Final    Culture - Blood (collected 04-15-21 @ 08:20)  Source: .Blood Blood-Peripheral  Final Report (04-20-21 @ 14:00):    No Growth Final             VITALS:  T(C): 36.7 (04-23-21 @ 04:43), Max: 36.8 (04-22-21 @ 15:57)  T(F): 98 (04-23-21 @ 04:43), Max: 98.3 (04-22-21 @ 21:30)  HR: 73 (04-23-21 @ 09:17) (69 - 78)  BP: 113/68 (04-23-21 @ 04:43) (104/65 - 113/68)  BP(mean): --  ABP: --  ABP(mean): --  RR: 20 (04-23-21 @ 04:43) (18 - 21)  SpO2: 95% (04-23-21 @ 09:17) (92% - 96%)  CVP(mm Hg): --  CVP(cm H2O): --    Ins and Outs     04-22-21 @ 07:01  -  04-23-21 @ 07:00  --------------------------------------------------------  IN: 1500 mL / OUT: 0 mL / NET: 1500 mL                I&O's Detail    22 Apr 2021 07:01  -  23 Apr 2021 07:00  --------------------------------------------------------  IN:    Oral Fluid: 1500 mL  Total IN: 1500 mL    OUT:  Total OUT: 0 mL    Total NET: 1500 mL

## 2021-04-23 NOTE — PROGRESS NOTE ADULT - ASSESSMENT
45M no sigPMHx other than HLD admitted with acute respiratory failure with hypoxia sec COVID- 19 PNA.    #COVID PNA  #Acute Hypoxic Resp failure -improving  -CT of chest: patchy b/l groundglass infiltrates  -pt saturating 88% on RA at rest, 87% on RA on ambulation, 93% on 3L NC on ambulation  -cont supplemental oxygen to keep SpO2 > 90%  -proning and Incentive spirometer as tolerated  -day 7 of IV Decadron and IV Remdesivir, may reduce course length if pt continues to improve  -cont Albuterol INH ATC, prn, Zinc, Guaifenesin, Vit D, Vit C  -elevated ferritin and elevated CRP 57, D-dimer 412-- follow inflammatory markers q72h  -Toci x 1 given 4/19/21  -Pulm on board  -ISS BID while on decadron    # Leukocytosis likely steroid induced  - monitor  - no signs of active infection     #CKD II  - likely chronic CKD  - continue to monitor  - avoid nephrotoxic agents    #mild Transaminitis  -secondary to Covid infection  -cont to monitor LFT's    #DVT ppx: lovenox    updated SonKunal [phone] on plan of care 4/23

## 2021-04-23 NOTE — PROGRESS NOTE ADULT - ASSESSMENT
Physical Examination:  GENERAL:               Alert, Oriented, No acute distress.    HEENT:                    No JVD, Moist MM  PULM:                     Bilateral air entry,  No Rales, No Rhonchi, No Wheezing  CVS:                         S1, S2,  No Murmur  ABD:                        Soft, nondistended, nontender, normoactive bowel sounds,   EXT:                         No edema, nontender, No Cyanosis or Clubbing   NEURO:                  Alert, oriented, interactive, nonfocal, follows commands  PSYC:                      Calm, + Insight.    Assessment  1. COVID 19 PNA  2. Acute Hypoxic respiratory failure    Plan  -S/p Tocilizumab 4/19  - Continue Decadron 6mg for 10 day course, Remdisivir for 10 day course    - Monitor pulse renal and liver function  - Supplemental o2 to maintain sat > 90%, now on 6lpm via oxymizer pendant    - Continuos Pulse ox monitoring   - Monitor for signs of bacterial infection  - Zinc/Vit C/Vit D  - Incentive spirometer  - Strict COVID 19 Isolation with airborne and contact isolation  - Cough suppression with Robitussin DM  - Trend biomarkers of inflammation  - DVT ppx  - GI PPX with Pepcid        COVID 19 specific considerations and therapeutic options based on the available and rapidly changing literature

## 2021-04-23 NOTE — PROGRESS NOTE ADULT - SUBJECTIVE AND OBJECTIVE BOX
Patient is a 45y old  Male who presents with a chief complaint of COVID 19 (22 Apr 2021 22:07)      Patient seen and examined at bedside. pt reports feeling better, minimal SOB and cough. no CP    ALLERGIES:  No Known Allergies    MEDICATIONS  (STANDING):  ALBUTerol    90 MICROgram(s) HFA Inhaler 2 Puff(s) Inhalation every 6 hours  ascorbic acid 1000 milliGRAM(s) Oral two times a day  benzonatate 100 milliGRAM(s) Oral every 8 hours  cholecalciferol 4000 Unit(s) Oral daily  dexAMETHasone  Injectable 6 milliGRAM(s) IV Push daily  enoxaparin Injectable 40 milliGRAM(s) SubCutaneous every 12 hours  famotidine    Tablet 20 milliGRAM(s) Oral daily  insulin lispro (ADMELOG) corrective regimen sliding scale   SubCutaneous two times a day before meals  remdesivir  IVPB 100 milliGRAM(s) IV Intermittent every 24 hours  senna 2 Tablet(s) Oral at bedtime  zinc sulfate 220 milliGRAM(s) Oral daily    MEDICATIONS  (PRN):  acetaminophen   Tablet .. 650 milliGRAM(s) Oral every 6 hours PRN Temp greater or equal to 38C (100.4F), Mild Pain (1 - 3)  bisacodyl 10 milliGRAM(s) Oral daily PRN Constipation  guaifenesin/dextromethorphan  Syrup 10 milliLiter(s) Oral every 6 hours PRN Cough  ondansetron Injectable 4 milliGRAM(s) IV Push every 8 hours PRN Nausea and/or Vomiting  polyethylene glycol 3350 17 Gram(s) Oral two times a day PRN Constipation  sodium chloride 0.65% Nasal 1 Spray(s) Both Nostrils every 4 hours PRN Nasal Congestion    Vital Signs Last 24 Hrs  T(F): 98 (23 Apr 2021 04:43), Max: 98.3 (22 Apr 2021 21:30)  HR: 73 (23 Apr 2021 09:17) (69 - 78)  BP: 113/68 (23 Apr 2021 04:43) (104/65 - 113/68)  RR: 20 (23 Apr 2021 04:43) (18 - 21)  SpO2: 95% (23 Apr 2021 09:17) (92% - 96%)  I&O's Summary    22 Apr 2021 07:01  -  23 Apr 2021 07:00  --------------------------------------------------------  IN: 1500 mL / OUT: 0 mL / NET: 1500 mL      PHYSICAL EXAM:  General: NAD, A/O x 3  ENT: MMM  Neck: Supple, No JVD  Lungs: Clear to auscultation bilaterally  Cardio: RRR, S1/S2, No murmurs  Abdomen: Soft, Nontender, Nondistended; Bowel sounds present  Extremities: No calf tenderness, No pitting edema    LABS:                        14.9   14.25 )-----------( 291      ( 23 Apr 2021 06:28 )             43.0     04-23    138  |  102  |  15  ----------------------------<  167  3.8   |  27  |  0.92    Ca    8.9      23 Apr 2021 06:28  Phos  3.9     04-23  Mg     2.3     04-23    TPro  6.2  /  Alb  2.7  /  TBili  0.4  /  DBili  x   /  AST  31  /  ALT  104  /  AlkPhos  63  04-23    eGFR if Non African American: 99 mL/min/1.73M2 (04-23-21 @ 06:28)  eGFR if African American: 115 mL/min/1.73M2 (04-23-21 @ 06:28)                          POCT Blood Glucose.: 149 mg/dL (23 Apr 2021 08:02)  POCT Blood Glucose.: 240 mg/dL (22 Apr 2021 17:16)            RADIOLOGY & ADDITIONAL TESTS:    Care Discussed with Consultants/Other Providers:

## 2021-04-24 ENCOUNTER — TRANSCRIPTION ENCOUNTER (OUTPATIENT)
Age: 46
End: 2021-04-24

## 2021-04-24 VITALS — OXYGEN SATURATION: 93 %

## 2021-04-24 LAB
ALBUMIN SERPL ELPH-MCNC: 2.9 G/DL — LOW (ref 3.3–5)
ALP SERPL-CCNC: 62 U/L — SIGNIFICANT CHANGE UP (ref 40–120)
ALT FLD-CCNC: 107 U/L — HIGH (ref 10–45)
ANION GAP SERPL CALC-SCNC: 9 MMOL/L — SIGNIFICANT CHANGE UP (ref 5–17)
AST SERPL-CCNC: 25 U/L — SIGNIFICANT CHANGE UP (ref 10–40)
BASOPHILS # BLD AUTO: 0.04 K/UL — SIGNIFICANT CHANGE UP (ref 0–0.2)
BASOPHILS NFR BLD AUTO: 0.3 % — SIGNIFICANT CHANGE UP (ref 0–2)
BILIRUB SERPL-MCNC: 0.5 MG/DL — SIGNIFICANT CHANGE UP (ref 0.2–1.2)
BUN SERPL-MCNC: 17 MG/DL — SIGNIFICANT CHANGE UP (ref 7–23)
CALCIUM SERPL-MCNC: 8.6 MG/DL — SIGNIFICANT CHANGE UP (ref 8.4–10.5)
CHLORIDE SERPL-SCNC: 103 MMOL/L — SIGNIFICANT CHANGE UP (ref 96–108)
CO2 SERPL-SCNC: 26 MMOL/L — SIGNIFICANT CHANGE UP (ref 22–31)
CREAT SERPL-MCNC: 0.98 MG/DL — SIGNIFICANT CHANGE UP (ref 0.5–1.3)
EOSINOPHIL # BLD AUTO: 0.09 K/UL — SIGNIFICANT CHANGE UP (ref 0–0.5)
EOSINOPHIL NFR BLD AUTO: 0.7 % — SIGNIFICANT CHANGE UP (ref 0–6)
GLUCOSE BLDC GLUCOMTR-MCNC: 152 MG/DL — HIGH (ref 70–99)
GLUCOSE SERPL-MCNC: 157 MG/DL — HIGH (ref 70–99)
HCT VFR BLD CALC: 45 % — SIGNIFICANT CHANGE UP (ref 39–50)
HGB BLD-MCNC: 15.4 G/DL — SIGNIFICANT CHANGE UP (ref 13–17)
IMM GRANULOCYTES NFR BLD AUTO: 4.5 % — HIGH (ref 0–1.5)
LYMPHOCYTES # BLD AUTO: 2.55 K/UL — SIGNIFICANT CHANGE UP (ref 1–3.3)
LYMPHOCYTES # BLD AUTO: 20.3 % — SIGNIFICANT CHANGE UP (ref 13–44)
MCHC RBC-ENTMCNC: 29.6 PG — SIGNIFICANT CHANGE UP (ref 27–34)
MCHC RBC-ENTMCNC: 34.2 GM/DL — SIGNIFICANT CHANGE UP (ref 32–36)
MCV RBC AUTO: 86.4 FL — SIGNIFICANT CHANGE UP (ref 80–100)
MONOCYTES # BLD AUTO: 0.54 K/UL — SIGNIFICANT CHANGE UP (ref 0–0.9)
MONOCYTES NFR BLD AUTO: 4.3 % — SIGNIFICANT CHANGE UP (ref 2–14)
NEUTROPHILS # BLD AUTO: 8.79 K/UL — HIGH (ref 1.8–7.4)
NEUTROPHILS NFR BLD AUTO: 69.9 % — SIGNIFICANT CHANGE UP (ref 43–77)
NRBC # BLD: 0 /100 WBCS — SIGNIFICANT CHANGE UP (ref 0–0)
PLATELET # BLD AUTO: 299 K/UL — SIGNIFICANT CHANGE UP (ref 150–400)
POTASSIUM SERPL-MCNC: 4.2 MMOL/L — SIGNIFICANT CHANGE UP (ref 3.5–5.3)
POTASSIUM SERPL-SCNC: 4.2 MMOL/L — SIGNIFICANT CHANGE UP (ref 3.5–5.3)
PROT SERPL-MCNC: 6.4 G/DL — SIGNIFICANT CHANGE UP (ref 6–8.3)
RBC # BLD: 5.21 M/UL — SIGNIFICANT CHANGE UP (ref 4.2–5.8)
RBC # FLD: 12.3 % — SIGNIFICANT CHANGE UP (ref 10.3–14.5)
SODIUM SERPL-SCNC: 138 MMOL/L — SIGNIFICANT CHANGE UP (ref 135–145)
WBC # BLD: 12.57 K/UL — HIGH (ref 3.8–10.5)
WBC # FLD AUTO: 12.57 K/UL — HIGH (ref 3.8–10.5)

## 2021-04-24 PROCEDURE — 84145 PROCALCITONIN (PCT): CPT

## 2021-04-24 PROCEDURE — 86769 SARS-COV-2 COVID-19 ANTIBODY: CPT

## 2021-04-24 PROCEDURE — 83735 ASSAY OF MAGNESIUM: CPT

## 2021-04-24 PROCEDURE — 80048 BASIC METABOLIC PNL TOTAL CA: CPT

## 2021-04-24 PROCEDURE — 83605 ASSAY OF LACTIC ACID: CPT

## 2021-04-24 PROCEDURE — 83615 LACTATE (LD) (LDH) ENZYME: CPT

## 2021-04-24 PROCEDURE — 36415 COLL VENOUS BLD VENIPUNCTURE: CPT

## 2021-04-24 PROCEDURE — 85379 FIBRIN DEGRADATION QUANT: CPT

## 2021-04-24 PROCEDURE — 93005 ELECTROCARDIOGRAM TRACING: CPT

## 2021-04-24 PROCEDURE — 85027 COMPLETE CBC AUTOMATED: CPT

## 2021-04-24 PROCEDURE — 83036 HEMOGLOBIN GLYCOSYLATED A1C: CPT

## 2021-04-24 PROCEDURE — 71045 X-RAY EXAM CHEST 1 VIEW: CPT

## 2021-04-24 PROCEDURE — 94640 AIRWAY INHALATION TREATMENT: CPT

## 2021-04-24 PROCEDURE — 94760 N-INVAS EAR/PLS OXIMETRY 1: CPT

## 2021-04-24 PROCEDURE — 86140 C-REACTIVE PROTEIN: CPT

## 2021-04-24 PROCEDURE — 85025 COMPLETE CBC W/AUTO DIFF WBC: CPT

## 2021-04-24 PROCEDURE — 84100 ASSAY OF PHOSPHORUS: CPT

## 2021-04-24 PROCEDURE — 84484 ASSAY OF TROPONIN QUANT: CPT

## 2021-04-24 PROCEDURE — 80053 COMPREHEN METABOLIC PANEL: CPT

## 2021-04-24 PROCEDURE — 99239 HOSP IP/OBS DSCHRG MGMT >30: CPT

## 2021-04-24 PROCEDURE — 83880 ASSAY OF NATRIURETIC PEPTIDE: CPT

## 2021-04-24 PROCEDURE — 85610 PROTHROMBIN TIME: CPT

## 2021-04-24 PROCEDURE — 82728 ASSAY OF FERRITIN: CPT

## 2021-04-24 PROCEDURE — 82962 GLUCOSE BLOOD TEST: CPT

## 2021-04-24 PROCEDURE — 87635 SARS-COV-2 COVID-19 AMP PRB: CPT

## 2021-04-24 PROCEDURE — 85730 THROMBOPLASTIN TIME PARTIAL: CPT

## 2021-04-24 PROCEDURE — 99285 EMERGENCY DEPT VISIT HI MDM: CPT | Mod: 25

## 2021-04-24 PROCEDURE — 36000 PLACE NEEDLE IN VEIN: CPT

## 2021-04-24 RX ORDER — ALBUTEROL 90 UG/1
2 AEROSOL, METERED ORAL
Qty: 0 | Refills: 0 | DISCHARGE
Start: 2021-04-24

## 2021-04-24 RX ORDER — ACETAMINOPHEN 500 MG
2 TABLET ORAL
Qty: 0 | Refills: 0 | DISCHARGE
Start: 2021-04-24

## 2021-04-24 RX ADMIN — Medication 6 MILLIGRAM(S): at 05:52

## 2021-04-24 RX ADMIN — ALBUTEROL 2 PUFF(S): 90 AEROSOL, METERED ORAL at 09:06

## 2021-04-24 RX ADMIN — REMDESIVIR 500 MILLIGRAM(S): 5 INJECTION INTRAVENOUS at 03:08

## 2021-04-24 RX ADMIN — Medication 100 MILLIGRAM(S): at 05:52

## 2021-04-24 RX ADMIN — ENOXAPARIN SODIUM 40 MILLIGRAM(S): 100 INJECTION SUBCUTANEOUS at 05:52

## 2021-04-24 RX ADMIN — ALBUTEROL 2 PUFF(S): 90 AEROSOL, METERED ORAL at 04:12

## 2021-04-24 RX ADMIN — Medication 1000 MILLIGRAM(S): at 05:52

## 2021-04-24 NOTE — DISCHARGE NOTE PROVIDER - NSDCCPCAREPLAN_GEN_ALL_CORE_FT
PRINCIPAL DISCHARGE DIAGNOSIS  Diagnosis: Pneumonia due to COVID-19 virus  Assessment and Plan of Treatment: You received 7 days of IV decadron and Remdesivir. Your oxygen saturation improved and you are stable for discharge home. Please follow up with PMD. Please return to ED if symptoms worsened.

## 2021-04-24 NOTE — PROGRESS NOTE ADULT - ASSESSMENT
45M no sigPMHx other than HLD admitted with acute respiratory failure with hypoxia sec COVID- 19 PNA.    #COVID PNA  #Acute Hypoxic Resp failure -improving  -CT of chest: patchy b/l groundglass infiltrates  -pt currently saturating 97% on 3L NC  -proning and Incentive spirometer as tolerated  -status post 7 days of IV Decadron and IV Remdesivir  -cont Albuterol INH ATC, prn, Zinc, Guaifenesin, Vit D, Vit C  -elevated ferritin and elevated CRP 57, D-dimer 412-- follow inflammatory markers q72h  -Toci x 1 given 4/19/21  -Pulm on board  -ISS BID while on decadron  -DC home today with home O2     # Leukocytosis likely steroid induced- doiwntrending  - monitor  - no signs of active infection     #CKD II  - likely chronic CKD  - continue to monitor  - avoid nephrotoxic agents    #mild Transaminitis  -secondary to Covid infection  -cont to monitor LFT's    #DVT ppx: lovenox    dispo: home with home oxygen today

## 2021-04-24 NOTE — PROGRESS NOTE ADULT - REASON FOR ADMISSION
COVID 19

## 2021-04-24 NOTE — DISCHARGE NOTE PROVIDER - HOSPITAL COURSE
45M no sigPMHx other than HLD admitted with acute respiratory failure with hypoxia sec COVID- 19 PNA. Pt completed 7 days of IV decadron and remdesivir. Now saturating 97% on 3L NC. Pt is stable for discharge home today with home oxygen.      Code: full code    Discharge provider: Gio KEEN Fran CHRISTIANSON [phone]    PMD: pt does not have a PMD    spend 33 min on discharge today

## 2021-04-24 NOTE — PROGRESS NOTE ADULT - PROVIDER SPECIALTY LIST ADULT
Hospitalist
Pulmonology
Hospitalist
Internal Medicine
Pulmonology
Hospitalist

## 2021-04-24 NOTE — DISCHARGE NOTE PROVIDER - NSDCFUADDAPPT_GEN_ALL_CORE_FT
Tele-Health follow up appointment with Dr. Moreno [phone] on 4/26/21 @[facility]

## 2021-04-24 NOTE — DISCHARGE NOTE PROVIDER - NSDCMRMEDTOKEN_GEN_ALL_CORE_FT
acetaminophen 325 mg oral tablet: 2 tab(s) orally every 6 hours, As needed, Temp greater or equal to 38C (100.4F), Mild Pain (1 - 3)  albuterol 90 mcg/inh inhalation aerosol: 2 puff(s) inhaled every 6 hours, As Needed for shortness of breath or wheezing  benzonatate 100 mg oral capsule: 1 cap(s) orally every 8 hours, As Needed for cough

## 2021-04-24 NOTE — PROGRESS NOTE ADULT - NSICDXPILOT_GEN_ALL_CORE
[place]

## 2021-04-24 NOTE — PROGRESS NOTE ADULT - SUBJECTIVE AND OBJECTIVE BOX
Patient is a 45y old  Male who presents with a chief complaint of COVID 19 (23 Apr 2021 14:15)      Patient seen and examined at bedside. reports feeling well, no sob or cp. minimal cough    ALLERGIES:  No Known Allergies    MEDICATIONS  (STANDING):  ALBUTerol    90 MICROgram(s) HFA Inhaler 2 Puff(s) Inhalation every 6 hours  ascorbic acid 1000 milliGRAM(s) Oral two times a day  benzonatate 100 milliGRAM(s) Oral every 8 hours  cholecalciferol 4000 Unit(s) Oral daily  dexAMETHasone  Injectable 6 milliGRAM(s) IV Push daily  enoxaparin Injectable 40 milliGRAM(s) SubCutaneous every 12 hours  famotidine    Tablet 20 milliGRAM(s) Oral daily  insulin lispro (ADMELOG) corrective regimen sliding scale   SubCutaneous two times a day before meals  remdesivir  IVPB 100 milliGRAM(s) IV Intermittent every 24 hours  senna 2 Tablet(s) Oral at bedtime  zinc sulfate 220 milliGRAM(s) Oral daily    MEDICATIONS  (PRN):  acetaminophen   Tablet .. 650 milliGRAM(s) Oral every 6 hours PRN Temp greater or equal to 38C (100.4F), Mild Pain (1 - 3)  bisacodyl 10 milliGRAM(s) Oral daily PRN Constipation  guaifenesin/dextromethorphan  Syrup 10 milliLiter(s) Oral every 6 hours PRN Cough  ondansetron Injectable 4 milliGRAM(s) IV Push every 8 hours PRN Nausea and/or Vomiting  polyethylene glycol 3350 17 Gram(s) Oral two times a day PRN Constipation  sodium chloride 0.65% Nasal 1 Spray(s) Both Nostrils every 4 hours PRN Nasal Congestion    Vital Signs Last 24 Hrs  T(F): 97.9 (24 Apr 2021 05:36), Max: 97.9 (24 Apr 2021 05:36)  HR: 83 (24 Apr 2021 09:08) (60 - 89)  BP: 112/65 (24 Apr 2021 05:36) (109/67 - 114/69)  RR: 16 (24 Apr 2021 05:36) (15 - 16)  SpO2: 93% (24 Apr 2021 09:08) (93% - 97%)  I&O's Summary    23 Apr 2021 07:01  -  24 Apr 2021 07:00  --------------------------------------------------------  IN: 0 mL / OUT: 1000 mL / NET: -1000 mL      PHYSICAL EXAM:  General: NAD, A/O x 3  ENT: MMM  Neck: Supple, No JVD  Lungs: Clear to auscultation bilaterally  Cardio: RRR, S1/S2, No murmurs  Abdomen: Soft, Nontender, Nondistended; Bowel sounds present  Extremities: No calf tenderness, No pitting edema    LABS:                        15.4   12.57 )-----------( 299      ( 24 Apr 2021 07:30 )             45.0     04-24    138  |  103  |  17  ----------------------------<  157  4.2   |  26  |  0.98    Ca    8.6      24 Apr 2021 07:30  Phos  3.9     04-23  Mg     2.3     04-23    TPro  6.4  /  Alb  2.9  /  TBili  0.5  /  DBili  x   /  AST  25  /  ALT  107  /  AlkPhos  62  04-24    eGFR if Non African American: 92 mL/min/1.73M2 (04-24-21 @ 07:30)  eGFR if : 106 mL/min/1.73M2 (04-24-21 @ 07:30)                          POCT Blood Glucose.: 152 mg/dL (24 Apr 2021 08:20)  POCT Blood Glucose.: 262 mg/dL (23 Apr 2021 17:03)            RADIOLOGY & ADDITIONAL TESTS:    Care Discussed with Consultants/Other Providers:

## 2021-04-25 ENCOUNTER — TRANSCRIPTION ENCOUNTER (OUTPATIENT)
Age: 46
End: 2021-04-25

## 2021-04-26 ENCOUNTER — APPOINTMENT (OUTPATIENT)
Dept: FAMILY MEDICINE | Facility: HOSPITAL | Age: 46
End: 2021-04-26

## 2021-04-26 NOTE — REVIEW OF SYSTEMS
[Fever] : no fever [Chills] : no chills [Fatigue] : no fatigue [Earache] : no earache [Nasal Discharge] : no nasal discharge [Sore Throat] : no sore throat [Chest Pain] : no chest pain [Palpitations] : no palpitations [Orthopena] : no orthopnea [Shortness Of Breath] : no shortness of breath [Wheezing] : no wheezing [Dyspnea on Exertion] : not dyspnea on exertion [Abdominal Pain] : no abdominal pain [Nausea] : no nausea [Constipation] : no constipation [Vomiting] : no vomiting [Heartburn] : no heartburn [Headache] : no headache [Dizziness] : no dizziness [FreeTextEntry6] : mild cough

## 2021-04-26 NOTE — PLAN
[Patient/Family counseled on discontinuation of isolation/quarantine. It can be discontinued if patient is at least 72 hrs] : Patient/Family counseled on discontinuation of isolation/quarantine. It can be discontinued if patient is at least 72 hrs without fever without fever-reducing medications AND improvement in respiratory symptoms (e.g. cough, shortness of breath) AND at least 10 days have passed since symptoms first appeared. [Patient/Family instructed to call PCP if any worsening or new symptoms] : Patient/Family instructed to call PCP if any worsening or new symptoms [FreeTextEntry1] : 1

## 2021-04-26 NOTE — HISTORY OF PRESENT ILLNESS
[Home] : at home, [unfilled] , at the time of the visit. [Medical Office: ([facility])___] : at the medical office located in  [Verbal consent obtained from patient] : the patient, [unfilled] [Post-hospitalization from ___ Hospital] : Post-hospitalization from [unfilled] Hospital [Admitted on: ___] : The patient was admitted on [unfilled] [Discharged on ___] : discharged on [unfilled] [Discharge Summary] : discharge summary [Discharge Med List] : discharge medication list [Med Reconciliation] : medication reconciliation has been completed [Patient discharged from recent hospitalization for COVID-19] : Patient discharged from recent hospitalization for COVID-19 [No] : no difficulty breathing [None] : none [Yes] : yes [FreeTextEntry2] : 44 y/o M no significant PMH recently discharged from  hospital. Pt tested positive for COVID-19 on 4/16/21 after his family members were exposed. Pt became symptomatic 4 days after with fever, chills, myalgias, cough, SOB. Pt came to  hospital on 4/15 for eval as was having SOB and abdominal pain. CT abdomen/pelvis done negative for abdominal pathology but positive for COVID pneumonia. Pt was d/c on azithromycin and albuterol with no improvement. At home pt desaturated to 83%. Pt came back to  ED on 4/16/21. Pt completed 7 day course Decadron and Remdesivir. Discharged home on 3L nasal cannula on 4/24/21\par \par Today pt states feel well, does c/o of some cough. Denies SOB, fever, chills, headache, dizziness, runny nose, sore throat, fatigue, body aches, abdominal pain, diarrhea, loss of taste, loss of smell. Pt is using pulse ox and O2 sat has been 94-96%.  Pt states not using home oxygen much, only sometimes with ambulation. Tomorrow is last day quarantine.

## 2021-05-22 ENCOUNTER — OUTPATIENT (OUTPATIENT)
Dept: OUTPATIENT SERVICES | Facility: HOSPITAL | Age: 46
LOS: 1 days | End: 2021-05-22
Payer: MEDICAID

## 2021-05-22 ENCOUNTER — APPOINTMENT (OUTPATIENT)
Dept: RADIOLOGY | Facility: HOSPITAL | Age: 46
End: 2021-05-22
Payer: MEDICAID

## 2021-05-22 DIAGNOSIS — Z00.8 ENCOUNTER FOR OTHER GENERAL EXAMINATION: ICD-10-CM

## 2021-05-22 PROCEDURE — 71046 X-RAY EXAM CHEST 2 VIEWS: CPT | Mod: 26

## 2021-05-22 PROCEDURE — 71046 X-RAY EXAM CHEST 2 VIEWS: CPT

## 2021-06-12 ENCOUNTER — OUTPATIENT (OUTPATIENT)
Dept: OUTPATIENT SERVICES | Facility: HOSPITAL | Age: 46
LOS: 1 days | End: 2021-06-12
Payer: MEDICAID

## 2021-06-12 ENCOUNTER — APPOINTMENT (OUTPATIENT)
Dept: RADIOLOGY | Facility: HOSPITAL | Age: 46
End: 2021-06-12
Payer: MEDICAID

## 2021-06-12 DIAGNOSIS — J98.4 OTHER DISORDERS OF LUNG: ICD-10-CM

## 2021-06-12 PROCEDURE — 71046 X-RAY EXAM CHEST 2 VIEWS: CPT

## 2021-06-12 PROCEDURE — 71046 X-RAY EXAM CHEST 2 VIEWS: CPT | Mod: 26

## 2021-07-02 ENCOUNTER — OUTPATIENT (OUTPATIENT)
Dept: OUTPATIENT SERVICES | Facility: HOSPITAL | Age: 46
LOS: 1 days | End: 2021-07-02
Payer: MEDICAID

## 2021-07-02 ENCOUNTER — APPOINTMENT (OUTPATIENT)
Dept: CT IMAGING | Facility: HOSPITAL | Age: 46
End: 2021-07-02
Payer: MEDICAID

## 2021-07-02 DIAGNOSIS — Z00.8 ENCOUNTER FOR OTHER GENERAL EXAMINATION: ICD-10-CM

## 2021-07-02 PROCEDURE — 71250 CT THORAX DX C-: CPT | Mod: 26

## 2021-07-02 PROCEDURE — 71250 CT THORAX DX C-: CPT

## 2021-08-12 PROBLEM — Z00.00 ENCOUNTER FOR PREVENTIVE HEALTH EXAMINATION: Status: ACTIVE | Noted: 2021-08-12

## 2021-08-13 ENCOUNTER — APPOINTMENT (OUTPATIENT)
Dept: PULMONOLOGY | Facility: CLINIC | Age: 46
End: 2021-08-13

## 2022-01-28 NOTE — ED ADULT NURSE NOTE - NS PRO PASSIVE SMOKE EXP
Patient is a 79y old  Male who presents with a chief complaint of R ICH (28 Jan 2022 12:26)       INTERVAL HPI/OVERNIGHT EVENTS:  Patient seen and evaluated at bedside.  Pt is resting comfortable in NAD.     Allergies    No Known Allergies    Intolerances        Vital Signs Last 24 Hrs  T(C): 37.2 (28 Jan 2022 13:41), Max: 37.3 (28 Jan 2022 04:40)  T(F): 98.9 (28 Jan 2022 13:41), Max: 99.2 (28 Jan 2022 04:40)  HR: 85 (28 Jan 2022 13:41) (85 - 87)  BP: 148/71 (28 Jan 2022 13:41) (120/76 - 148/71)  BP(mean): --  RR: 18 (28 Jan 2022 13:41) (18 - 22)  SpO2: 98% (28 Jan 2022 13:41) (95% - 98%)    LABS:                        12.0   6.72  )-----------( 205      ( 28 Jan 2022 07:20 )             38.1     01-28    146<H>  |  102  |  39<H>  ----------------------------<  191<H>  4.0   |  32<H>  |  0.97    Ca    10.7<H>      28 Jan 2022 07:23  Phos  3.8     01-28  Mg     2.6     01-28          CAPILLARY BLOOD GLUCOSE      POCT Blood Glucose.: 159 mg/dL (28 Jan 2022 18:05)  POCT Blood Glucose.: 199 mg/dL (28 Jan 2022 12:16)  POCT Blood Glucose.: 180 mg/dL (28 Jan 2022 06:15)  POCT Blood Glucose.: 154 mg/dL (28 Jan 2022 00:11)      Lower Extremity Physical Exam:  Vasular: DP/PT 2/4, B/L, CFT <3 seconds B/L, Temperature gradient WNL, B/L.   Neuro: unable to assess  Musculoskeletal/Ortho: contracted hammertoes 2-5 bilat  Skin: bilat foot partial thickness wounds secondary to abrasions toes 2/3/4 on left and 2nd toe on right with no signs of infection   No

## 2022-02-07 ENCOUNTER — APPOINTMENT (OUTPATIENT)
Dept: FAMILY MEDICINE | Facility: HOSPITAL | Age: 47
End: 2022-02-07

## 2022-02-07 ENCOUNTER — MED ADMIN CHARGE (OUTPATIENT)
Age: 47
End: 2022-02-07

## 2022-02-07 ENCOUNTER — OUTPATIENT (OUTPATIENT)
Dept: OUTPATIENT SERVICES | Facility: HOSPITAL | Age: 47
LOS: 1 days | End: 2022-02-07
Payer: SELF-PAY

## 2022-02-07 VITALS
HEART RATE: 63 BPM | OXYGEN SATURATION: 98 % | SYSTOLIC BLOOD PRESSURE: 144 MMHG | DIASTOLIC BLOOD PRESSURE: 90 MMHG | TEMPERATURE: 97.8 F | RESPIRATION RATE: 18 BRPM | WEIGHT: 212 LBS

## 2022-02-07 VITALS — BODY MASS INDEX: 32.23 KG/M2 | HEIGHT: 68 IN

## 2022-02-07 DIAGNOSIS — J12.82 COVID-19: ICD-10-CM

## 2022-02-07 DIAGNOSIS — U07.1 COVID-19: ICD-10-CM

## 2022-02-07 DIAGNOSIS — Z00.00 ENCOUNTER FOR GENERAL ADULT MEDICAL EXAMINATION WITHOUT ABNORMAL FINDINGS: ICD-10-CM

## 2022-02-07 PROCEDURE — G0463: CPT

## 2022-02-07 PROCEDURE — 87338 HPYLORI STOOL AG IA: CPT

## 2022-02-07 PROCEDURE — 82274 ASSAY TEST FOR BLOOD FECAL: CPT

## 2022-02-07 RX ORDER — PANTOPRAZOLE 20 MG/1
20 TABLET, DELAYED RELEASE ORAL DAILY
Qty: 60 | Refills: 0 | Status: DISCONTINUED | COMMUNITY
Start: 2022-02-07 | End: 2022-02-07

## 2022-02-07 RX ORDER — ASPIRIN 81 MG/1
81 TABLET, CHEWABLE ORAL
Qty: 30 | Refills: 0 | Status: DISCONTINUED | COMMUNITY
Start: 2021-04-26 | End: 2022-02-07

## 2022-02-08 DIAGNOSIS — Z12.11 ENCOUNTER FOR SCREENING FOR MALIGNANT NEOPLASM OF COLON: ICD-10-CM

## 2022-02-08 DIAGNOSIS — K21.9 GASTRO-ESOPHAGEAL REFLUX DISEASE WITHOUT ESOPHAGITIS: ICD-10-CM

## 2022-02-08 NOTE — REVIEW OF SYSTEMS
[Abdominal Pain] : abdominal pain [Heartburn] : heartburn [Fever] : no fever [Chills] : no chills [Chest Pain] : no chest pain [Shortness Of Breath] : no shortness of breath [Cough] : no cough [Nausea] : no nausea [Constipation] : no constipation [Diarrhea] : no diarrhea [Vomiting] : no vomiting

## 2022-02-08 NOTE — PLAN
[FreeTextEntry1] : Pt is a 46yoM w/ PMHx of prediabetes on metformin complaining today of chronic progressive dull abdominal pain in lower abdomen [id number] and burning epigastric painx6 months. lower abdominal pain is constant, epigastric pain is precipitated by eating. denies fevers, chills, nausea, vomiting, diarrhea and constipation. Pt states he has had an endoscopy two years ago for his lower abdominal pain w/ no findings. Likely 2/2 GERD.  \par \par #GERD\par -Ordered H pylori\par -Ordered PPI trial therapy \par -No red flag symptoms at this time\par \par #Health\par -Ordered FIT

## 2022-02-08 NOTE — HEALTH RISK ASSESSMENT
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

## 2022-02-08 NOTE — HISTORY OF PRESENT ILLNESS
[FreeTextEntry1] : NP and GI  [de-identified] : Sanjay Dubose is a 46 year old male s/p COVID pneumonia hospitalization 4/21 with PMHx of "prediabetes" on metformin, complaining today of chronic progressive abdominal pain. Patient states he's had lower abdominal pain for over 2 years that "feels like someone is punching me," and newer onset "burning" epigastric pain that is precipitating by eating and lasts for the whole day after. Patient states that the burning pain sometimes wakes him up at night. Pt endorses regurgitation and denies fevers, chills, nausea, vomiting, diarrhea and constipation. Pt states he has had an endoscopy two years ago for his lower abdominal pain w/ no findings.

## 2022-02-08 NOTE — PHYSICAL EXAM
[No Acute Distress] : no acute distress [Well Nourished] : well nourished [Well Developed] : well developed [Well-Appearing] : well-appearing [No Respiratory Distress] : no respiratory distress  [No Accessory Muscle Use] : no accessory muscle use [Clear to Auscultation] : lungs were clear to auscultation bilaterally [Normal Rate] : normal rate  [Regular Rhythm] : with a regular rhythm [Normal S1, S2] : normal S1 and S2 [No Murmur] : no murmur heard [Soft] : abdomen soft [Non-distended] : non-distended [No Masses] : no abdominal mass palpated [Normal Bowel Sounds] : normal bowel sounds [No Rash] : no rash [Normal Affect] : the affect was normal [Normal Insight/Judgement] : insight and judgment were intact [de-identified] : diffusely tender to palpation, mainly in epigastric and lower abdominal areas

## 2022-02-23 LAB
H PYLORI AG STL QL: NOT DETECTED
HEMOCCULT STL QL IA: NEGATIVE

## 2022-03-04 ENCOUNTER — APPOINTMENT (OUTPATIENT)
Dept: FAMILY MEDICINE | Facility: HOSPITAL | Age: 47
End: 2022-03-04

## 2022-04-27 ENCOUNTER — APPOINTMENT (OUTPATIENT)
Dept: FAMILY MEDICINE | Facility: HOSPITAL | Age: 47
End: 2022-04-27

## 2022-04-27 ENCOUNTER — OUTPATIENT (OUTPATIENT)
Dept: OUTPATIENT SERVICES | Facility: HOSPITAL | Age: 47
LOS: 1 days | End: 2022-04-27
Payer: SELF-PAY

## 2022-04-27 VITALS
WEIGHT: 215 LBS | HEART RATE: 71 BPM | SYSTOLIC BLOOD PRESSURE: 147 MMHG | TEMPERATURE: 97.8 F | OXYGEN SATURATION: 99 % | BODY MASS INDEX: 32.69 KG/M2 | DIASTOLIC BLOOD PRESSURE: 90 MMHG | RESPIRATION RATE: 16 BRPM

## 2022-04-27 DIAGNOSIS — Z00.00 ENCOUNTER FOR GENERAL ADULT MEDICAL EXAMINATION WITHOUT ABNORMAL FINDINGS: ICD-10-CM

## 2022-04-27 PROCEDURE — 80061 LIPID PANEL: CPT

## 2022-04-27 PROCEDURE — 83036 HEMOGLOBIN GLYCOSYLATED A1C: CPT

## 2022-04-27 PROCEDURE — 85025 COMPLETE CBC W/AUTO DIFF WBC: CPT

## 2022-04-27 PROCEDURE — 84443 ASSAY THYROID STIM HORMONE: CPT

## 2022-04-27 PROCEDURE — G0463: CPT

## 2022-04-27 PROCEDURE — 80053 COMPREHEN METABOLIC PANEL: CPT

## 2022-04-28 RX ORDER — PANTOPRAZOLE 40 MG/1
40 TABLET, DELAYED RELEASE ORAL
Qty: 60 | Refills: 0 | Status: DISCONTINUED | COMMUNITY
Start: 2021-12-04

## 2022-04-28 RX ORDER — MELOXICAM 7.5 MG/1
7.5 TABLET ORAL
Qty: 15 | Refills: 0 | Status: DISCONTINUED | COMMUNITY
Start: 2021-12-04

## 2022-04-28 RX ORDER — METFORMIN HYDROCHLORIDE 500 MG/1
500 TABLET, COATED ORAL
Qty: 90 | Refills: 0 | Status: DISCONTINUED | COMMUNITY
Start: 2021-12-06

## 2022-05-02 NOTE — HISTORY OF PRESENT ILLNESS
[FreeTextEntry1] : 47 y/o M presents for f/u abdominal pain [de-identified] : 47 y/o M PMH COVID (4/21), GERD presents for f/u abdominal pain. Pt was seen 2 months ago c/o of epigastric abdominal pain x 8 months and lower abdominal pain x 3 years. Pt was prescribed pantoprazole 40 mg and H pylori test was ordered which was negative. Pt continues to have same sxs of epigastric and lower abdominal pain, associated with bloating and sour taste in mouth. States sxs improve minimally with PPI. Denies N/V, vomiting, constipation, diarrhea, melena, hematochezia, weight loss, chest pain, SOB, dysuria. States had endoscopy 2 years ago that was normal. Drinks alcohol socially about once a month, no cigarette use, no recreational drug use.

## 2022-05-02 NOTE — PHYSICAL EXAM
[Normal] : normal rate, regular rhythm, normal S1 and S2 and no murmur heard [Soft] : abdomen soft [Non-distended] : non-distended [No HSM] : no HSM [Normal Bowel Sounds] : normal bowel sounds [de-identified] : -mild TTP in epigastric and lower abdomen, no rebound, no guarding

## 2022-05-02 NOTE — PLAN
[FreeTextEntry1] : 47 y/o M PMH COVID (4/21), GERD presents for f/u abdominal pain\par \par #Abdominal pain\par -epigastric pain x 8 months and lower abdominal pain x 3 years\par -sxs associated with bloating and sour taste in mouth, consistent with GERD\par -H pylori negative\par -pt taking pantoprazole 40 mg with only slight relief\par -will f/u bloodwork, if wnl will consider further evaluation with imaging and GI consult\par -life style modifications discussed \par -elevate the head of the bed by six inches\par -decrease fat intake \par -reduce alcohol consumption\par -recommend losing weight \par -avoid recumbency for three hours postprandially and not consuming large meals and certain types of food such as caffeinated products, peppermint, fatty foods, chocolate,  spicy foods, citrus fruits, tomatoes etc\par \par #HCM\par -FIT test negative\par \par RTC in one month for f/u abdominal pain\par \par Case discussed with Dr. Slaughter\par

## 2022-05-02 NOTE — REVIEW OF SYSTEMS
[Abdominal Pain] : abdominal pain [Fever] : no fever [Chills] : no chills [Fatigue] : no fatigue [Hot Flashes] : no hot flashes [Chest Pain] : no chest pain [Palpitations] : no palpitations [Lower Ext Edema] : no lower extremity edema [Orthopena] : no orthopnea [Shortness Of Breath] : no shortness of breath [Wheezing] : no wheezing [Cough] : no cough [Dyspnea on Exertion] : not dyspnea on exertion [Nausea] : no nausea [Constipation] : no constipation [Diarrhea] : no diarrhea [Vomiting] : no vomiting [Heartburn] : no heartburn [Melena] : no melena

## 2022-05-03 ENCOUNTER — OUTPATIENT (OUTPATIENT)
Dept: OUTPATIENT SERVICES | Facility: HOSPITAL | Age: 47
LOS: 1 days | End: 2022-05-03

## 2022-05-03 DIAGNOSIS — E66.9 OBESITY, UNSPECIFIED: ICD-10-CM

## 2022-05-09 LAB
ALBUMIN SERPL ELPH-MCNC: 4.8 G/DL
ALP BLD-CCNC: 56 U/L
ALT SERPL-CCNC: 40 U/L
ANION GAP SERPL CALC-SCNC: 12 MMOL/L
AST SERPL-CCNC: 29 U/L
BASOPHILS # BLD AUTO: 0.06 K/UL
BASOPHILS NFR BLD AUTO: 1 %
BILIRUB SERPL-MCNC: 1 MG/DL
BUN SERPL-MCNC: 12 MG/DL
CALCIUM SERPL-MCNC: 9.6 MG/DL
CHLORIDE SERPL-SCNC: 105 MMOL/L
CHOLEST SERPL-MCNC: 237 MG/DL
CO2 SERPL-SCNC: 22 MMOL/L
CREAT SERPL-MCNC: 0.95 MG/DL
EGFR: 99 ML/MIN/1.73M2
EOSINOPHIL # BLD AUTO: 0.16 K/UL
EOSINOPHIL NFR BLD AUTO: 2.6 %
ESTIMATED AVERAGE GLUCOSE: 126 MG/DL
GLUCOSE SERPL-MCNC: 116 MG/DL
HBA1C MFR BLD HPLC: 6 %
HCT VFR BLD CALC: 47.8 %
HDLC SERPL-MCNC: 41 MG/DL
HGB BLD-MCNC: 16.1 G/DL
IMM GRANULOCYTES NFR BLD AUTO: 0.2 %
LDLC SERPL CALC-MCNC: 160 MG/DL
LYMPHOCYTES # BLD AUTO: 2.44 K/UL
LYMPHOCYTES NFR BLD AUTO: 39 %
MAN DIFF?: NORMAL
MCHC RBC-ENTMCNC: 28.4 PG
MCHC RBC-ENTMCNC: 33.7 GM/DL
MCV RBC AUTO: 84.5 FL
MONOCYTES # BLD AUTO: 0.57 K/UL
MONOCYTES NFR BLD AUTO: 9.1 %
NEUTROPHILS # BLD AUTO: 3.02 K/UL
NEUTROPHILS NFR BLD AUTO: 48.1 %
NONHDLC SERPL-MCNC: 196 MG/DL
PLATELET # BLD AUTO: 218 K/UL
POTASSIUM SERPL-SCNC: 4.2 MMOL/L
PROT SERPL-MCNC: 6.8 G/DL
RBC # BLD: 5.66 M/UL
RBC # FLD: 12.3 %
SODIUM SERPL-SCNC: 139 MMOL/L
TRIGL SERPL-MCNC: 180 MG/DL
TSH SERPL-ACNC: 2.07 UIU/ML
WBC # FLD AUTO: 6.26 K/UL

## 2022-06-10 ENCOUNTER — APPOINTMENT (OUTPATIENT)
Dept: FAMILY MEDICINE | Facility: HOSPITAL | Age: 47
End: 2022-06-10

## 2022-06-19 NOTE — ED ADULT NURSE NOTE - COMFORT/ACCEPTABLE PAIN LEVEL (0-10)
2 discussed pts current clinical status , management plan in detail in length with pt and pts family  discussed management plan with house staff covering pt

## 2022-07-28 ENCOUNTER — OUTPATIENT (OUTPATIENT)
Dept: OUTPATIENT SERVICES | Facility: HOSPITAL | Age: 47
LOS: 1 days | End: 2022-07-28
Payer: SELF-PAY

## 2022-07-28 ENCOUNTER — APPOINTMENT (OUTPATIENT)
Dept: FAMILY MEDICINE | Facility: HOSPITAL | Age: 47
End: 2022-07-28

## 2022-07-28 ENCOUNTER — RESULT CHARGE (OUTPATIENT)
Age: 47
End: 2022-07-28

## 2022-07-28 VITALS
BODY MASS INDEX: 31.63 KG/M2 | HEART RATE: 103 BPM | SYSTOLIC BLOOD PRESSURE: 115 MMHG | OXYGEN SATURATION: 96 % | TEMPERATURE: 97.9 F | WEIGHT: 208 LBS | RESPIRATION RATE: 16 BRPM | DIASTOLIC BLOOD PRESSURE: 82 MMHG

## 2022-07-28 DIAGNOSIS — Z00.00 ENCOUNTER FOR GENERAL ADULT MEDICAL EXAMINATION WITHOUT ABNORMAL FINDINGS: ICD-10-CM

## 2022-07-28 PROCEDURE — G0463: CPT

## 2022-07-29 DIAGNOSIS — K21.9 GASTRO-ESOPHAGEAL REFLUX DISEASE WITHOUT ESOPHAGITIS: ICD-10-CM

## 2022-07-29 DIAGNOSIS — M79.89 OTHER SPECIFIED SOFT TISSUE DISORDERS: ICD-10-CM

## 2022-07-29 DIAGNOSIS — R35.1 NOCTURIA: ICD-10-CM

## 2022-07-29 LAB
BILIRUB UR QL STRIP: NEGATIVE
CLARITY UR: NORMAL
COLLECTION METHOD: NORMAL
GLUCOSE UR-MCNC: NEGATIVE
HCG UR QL: 0.2 EU/DL
HGB UR QL STRIP.AUTO: NEGATIVE
KETONES UR-MCNC: NEGATIVE
LEUKOCYTE ESTERASE UR QL STRIP: NEGATIVE
NITRITE UR QL STRIP: NEGATIVE
PH UR STRIP: 5.5
PROT UR STRIP-MCNC: NEGATIVE
SP GR UR STRIP: 1.02

## 2022-08-18 ENCOUNTER — OUTPATIENT (OUTPATIENT)
Dept: OUTPATIENT SERVICES | Facility: HOSPITAL | Age: 47
LOS: 1 days | End: 2022-08-18
Payer: SELF-PAY

## 2022-08-18 DIAGNOSIS — M79.89 OTHER SPECIFIED SOFT TISSUE DISORDERS: ICD-10-CM

## 2022-08-18 DIAGNOSIS — Z00.00 ENCOUNTER FOR GENERAL ADULT MEDICAL EXAMINATION WITHOUT ABNORMAL FINDINGS: ICD-10-CM

## 2022-08-18 PROCEDURE — 93306 TTE W/DOPPLER COMPLETE: CPT

## 2022-08-18 PROCEDURE — 93306 TTE W/DOPPLER COMPLETE: CPT | Mod: 26

## 2022-08-26 ENCOUNTER — OUTPATIENT (OUTPATIENT)
Dept: OUTPATIENT SERVICES | Facility: HOSPITAL | Age: 47
LOS: 1 days | End: 2022-08-26
Payer: SELF-PAY

## 2022-08-26 ENCOUNTER — APPOINTMENT (OUTPATIENT)
Dept: FAMILY MEDICINE | Facility: HOSPITAL | Age: 47
End: 2022-08-26

## 2022-08-26 VITALS
HEART RATE: 66 BPM | TEMPERATURE: 98.3 F | RESPIRATION RATE: 16 BRPM | SYSTOLIC BLOOD PRESSURE: 121 MMHG | DIASTOLIC BLOOD PRESSURE: 81 MMHG | WEIGHT: 211 LBS | HEIGHT: 68 IN | BODY MASS INDEX: 31.98 KG/M2 | OXYGEN SATURATION: 97 %

## 2022-08-26 DIAGNOSIS — Z00.00 ENCOUNTER FOR GENERAL ADULT MEDICAL EXAMINATION WITHOUT ABNORMAL FINDINGS: ICD-10-CM

## 2022-08-26 DIAGNOSIS — M79.89 OTHER SPECIFIED SOFT TISSUE DISORDERS: ICD-10-CM

## 2022-08-26 DIAGNOSIS — M54.50 LOW BACK PAIN, UNSPECIFIED: ICD-10-CM

## 2022-08-26 PROCEDURE — G0463: CPT

## 2022-08-26 NOTE — PHYSICAL EXAM
[Normal] : no jugular venous distention, supple, no lymphadenopathy and the thyroid was normal and there were no nodules present [Normal Rate] : normal rate  [Regular Rhythm] : with a regular rhythm [No Varicosities] : no varicosities [Pedal Pulses Present] : the pedal pulses are present [No Extremity Clubbing/Cyanosis] : no extremity clubbing/cyanosis [Soft] : abdomen soft [Non-distended] : non-distended [No Masses] : no abdominal mass palpated [No HSM] : no HSM [Normal Bowel Sounds] : normal bowel sounds [de-identified] : b/l LE with 1+ pitting edema 3/4 up calf [de-identified] : diffuse tenderness to deep palpation of all quadrants, most prominent in epigastric region and lower 2 quadrants

## 2022-08-26 NOTE — HISTORY OF PRESENT ILLNESS
[FreeTextEntry1] : f/u abd pain [de-identified] : 47 y/o M PMH GERD here for f/u abdominal pain for about 1 year. He is having diffuse abdominal pain, most prominent in the epigastric region and lower quadrants. Pt last seen 4/27/22 and was recommended lifestyle and dietary modifications, and to continue pantoprazole. Pt reports no change in symptoms. Symptoms sometimes associated with eating. H pylori was negative and pt reports endoscopy from 2 years ago was normal. Denies fever, N/V, constipation, diarrhea, hematochezia, melena.      \par \par Today pt also reports b/l lower extremity swelling with pressure for the past 5 months. Swelling is worse after standing for prolonged period of time and mildly alleviated with elevation of the legs. He reports SOB and decreased exercise tolerance with occasional dizziness with no falls. He used to run for 1 hour 15 mins but now reports only feeling able to run 45 mins. Denies fever, chest pain, cough, palpitations, orthopnea.

## 2022-08-26 NOTE — REVIEW OF SYSTEMS
[Lower Ext Edema] : lower extremity edema [Abdominal Pain] : abdominal pain [Heartburn] : heartburn [Dyspnea on Exertion] : dyspnea on exertion [Fever] : no fever [Chest Pain] : no chest pain [Palpitations] : no palpitations [Orthopena] : no orthopnea [Shortness Of Breath] : no shortness of breath [Cough] : no cough [Nausea] : no nausea [Constipation] : no constipation [Diarrhea] : no diarrhea [Vomiting] : no vomiting [Melena] : no melena [Dysuria] : no dysuria [Incontinence] : no incontinence [Hesitancy] : no hesitancy [Nocturia] : no nocturia [Hematuria] : no hematuria [Frequency] : no frequency [Dizziness] : no dizziness [Fainting] : no fainting

## 2022-08-27 DIAGNOSIS — K21.9 GASTRO-ESOPHAGEAL REFLUX DISEASE WITHOUT ESOPHAGITIS: ICD-10-CM

## 2022-08-27 DIAGNOSIS — M79.89 OTHER SPECIFIED SOFT TISSUE DISORDERS: ICD-10-CM

## 2022-08-27 DIAGNOSIS — M54.50 LOW BACK PAIN, UNSPECIFIED: ICD-10-CM

## 2022-09-06 NOTE — PHYSICAL EXAM
[Normal] : normal rate, regular rhythm, normal S1 and S2 and no murmur heard [No Edema] : there was no peripheral edema [No Extremity Clubbing/Cyanosis] : no extremity clubbing/cyanosis [Soft] : abdomen soft [Non-distended] : non-distended [No HSM] : no HSM [Normal Bowel Sounds] : normal bowel sounds [No CVA Tenderness] : no CVA  tenderness [No Spinal Tenderness] : no spinal tenderness [de-identified] : mildly TTP in LLQ [de-identified] : + straight leg test

## 2022-09-06 NOTE — HISTORY OF PRESENT ILLNESS
[FreeTextEntry1] : 45 y/o M presents for GERD and leg swelling  [de-identified] : 47 y/o M with a PMH of GERD and obesity presents today for f/u of GERD symptoms and leg swelling. He states that his symptoms have been about the same as last time, continues to have epigastric abdominal pain. Symptoms worse with eating. He states that he made the dietary modifications discussed last time. He has cut out coffee, soda, beef, cheese, greasy foods and chocolate. His diet consists of mostly chicken, veggies, and banana shakes with milk. He states when he eats healthy, he doesn't have symptoms but when he has other foods, they trigger his symptoms. The pantoprazole helps to relieve his symptoms. He denies any nausea, vomiting, diarrhea, weight loss, blood in stool, dysphagia or odynophagia. Pt states had endoscopy 2 years ago and was normal.  \par \par He states that his leg swelling has improved. He notices it mostly when he's sitting for extended periods of time. He denies any history of heart or kidney issues in his family. His recent echo was normal. \par \par Today, he also complains of back pain x3 weeks. He localizes the pain to his left lumbar/sacral region and describes it as pinching. It is nonradiating and not associated with any trauma. He states that sitting exacerbates the pain and standing and moving around makes it better. He has tried Tylenol for the pain and has not had any relief. He denies any saddle anesthesia or fecal/urinary incontinence.

## 2022-09-06 NOTE — PLAN
[FreeTextEntry1] : 45 y/o M with a PMH of GERD and obesity presents today for f/u of abdominal pain and leg swelling. He also is complaining of back pain. \par \par #Abdominal pain\par -Pt states slight improvement of symptoms when eating healthy and following dietary modifications but still has epigastric pain\par -H pylori negative\par -Pt advised to replace the milk in his shakes with a non-dairy alternative such as almond or soy milk\par -avoid recumbency for three hours postprandially and not consuming large meals and certain types of food such as caffeinated products, peppermint, fatty foods, chocolate, spicy foods, citrus fruits, tomatoes etc\par -Refill of pantoprazole sent to pharmacy\par -Pt has GI doctor appointment on 9/20 \par \par #Leg swelling\par -Pt states swelling has improved since last visit\par -Recent echo was normal and recent UA unremarkable\par -Pt advised to elevate legs and use compression stockings \par \par #Back pain\par -Pt complains of nonradiating pinching L back pain x 3 weeks\par -Pt states he usually showers in the morning and is advised to take hot showers in the morning and to stretch his back and gluteus damian muscles afterwards and to use ice in the evening to reduce inflammation\par -Pt given back exercise packet\par -Pt requests ibuprofen to be sent to the pharmacy \par \par #Positive audit screen\par -Pt's score is 8. States he usually has 6-10 shots or 10 beers every Saturday\par -Pt counseled on reducing his intake to <4 alcoholic beverages in a single setting\par -Pt agreeable to cut down his alcohol intake \par \par RTC in one month f/u abdominal pain \par \par Case discussed with Dr. Mcmanus

## 2022-09-06 NOTE — REVIEW OF SYSTEMS
[Abdominal Pain] : abdominal pain [Heartburn] : heartburn [Back Pain] : back pain [Negative] : Neurological [Nausea] : no nausea [Constipation] : no constipation [Diarrhea] : no diarrhea [Vomiting] : no vomiting [Melena] : no melena [Dysuria] : no dysuria [Incontinence] : no incontinence [Hematuria] : no hematuria [Muscle Weakness] : no muscle weakness

## 2022-09-06 NOTE — HEALTH RISK ASSESSMENT
[Never] : Never [Yes] : Yes [2 - 4 times a month (2 pts)] : 2-4 times a month (2 points) [Weekly (3 pts)] : Weekly (3 points) [No] : In the past 12 months have you used drugs other than those required for medical reasons? No [7 to 9 (3 pts)] : 7 to 9 (3  points) [Audit-CScore] : 8 [de-identified] : runs for an hour every day, lifts weights in the evenings [de-identified] : chicken, veggies, shakes

## 2022-09-20 ENCOUNTER — OUTPATIENT (OUTPATIENT)
Dept: OUTPATIENT SERVICES | Facility: HOSPITAL | Age: 47
LOS: 1 days | End: 2022-09-20
Payer: SELF-PAY

## 2022-09-20 ENCOUNTER — APPOINTMENT (OUTPATIENT)
Dept: GASTROENTEROLOGY | Facility: HOSPITAL | Age: 47
End: 2022-09-20

## 2022-09-20 VITALS
SYSTOLIC BLOOD PRESSURE: 137 MMHG | TEMPERATURE: 97 F | HEART RATE: 86 BPM | BODY MASS INDEX: 31.98 KG/M2 | DIASTOLIC BLOOD PRESSURE: 89 MMHG | HEIGHT: 68 IN | WEIGHT: 211 LBS | RESPIRATION RATE: 14 BRPM

## 2022-09-20 DIAGNOSIS — K31.9 DISEASE OF STOMACH AND DUODENUM, UNSPECIFIED: ICD-10-CM

## 2022-09-20 DIAGNOSIS — R10.13 EPIGASTRIC PAIN: ICD-10-CM

## 2022-09-20 DIAGNOSIS — Z12.11 ENCOUNTER FOR SCREENING FOR MALIGNANT NEOPLASM OF COLON: ICD-10-CM

## 2022-09-20 DIAGNOSIS — G89.29 EPIGASTRIC PAIN: ICD-10-CM

## 2022-09-20 PROCEDURE — ZZZZZ: CPT | Mod: GC

## 2022-09-20 PROCEDURE — G0463: CPT

## 2022-09-20 RX ORDER — IBUPROFEN 600 MG/1
600 TABLET, FILM COATED ORAL TWICE DAILY
Qty: 20 | Refills: 0 | Status: DISCONTINUED | COMMUNITY
Start: 2022-08-26 | End: 2022-09-20

## 2022-09-20 NOTE — HISTORY OF PRESENT ILLNESS
[FreeTextEntry1] : 46 year old male with GERD and obesity who presents for epigastric burning. \par \par Patient has had abdominal pain for 10 years. Patient reports that abdominal pain feels like "fire". Pain is located in epigastric region but also diffuse. Pain is worse with food (any food). Pain is worse occasionally at night. Patient started taking pantoprazole 40mg every morning prior to food. Patient reports that PPI helps but he continues to have pain. No weight loss. Diet consists of red meat. \par \par Patient reports soft 1-2x BM per day. \par \par Patient reports endoscopy 2-3 years ago and patient reports it was normal. Endoscopy was done at [facility].\par \par Medications: patient was prescribed ibuprofen for back pain but has not taken. \par \par FH: negative \par SH: no smoking, alcohol use socially 6-10 drinks that he reports only on weekends \par   \par \par

## 2022-09-20 NOTE — PHYSICAL EXAM
[Alert] : alert [Normal Voice/Communication] : normal voice/communication [Well Developed] : well developed [Well Nourished] : well nourished [Sclera] : the sclera and conjunctiva were normal [Normal Appearance] : the appearance of the neck was normal [No Respiratory Distress] : no respiratory distress [No Acc Muscle Use] : no accessory muscle use [Respiration, Rhythm And Depth] : normal respiratory rhythm and effort [Normal] : normal bowel sounds, non-tender, no masses, soft, no no hepato-splenomegaly [Abdomen Tenderness] : non-tender [No Masses] : no abdominal mass palpated [Abdomen Soft] : soft [Abnormal Walk] : normal gait [No Joint Swelling] : no joint swelling seen [No Focal Deficits] : no focal deficits [Oriented To Time, Place, And Person] : oriented to person, place, and time [Normal Affect] : the affect was normal [Normal Mood] : the mood was normal [Ascites] : no ascites [de-identified] : epigastric pain with palpation

## 2022-09-20 NOTE — END OF VISIT
[] : Fellow [FreeTextEntry3] : As modified and discussed with patient\par MD DARREN Vines [facility]\par Associate Professor of Medicine\par [facility] [employer]\par

## 2022-09-20 NOTE — ASSESSMENT
[FreeTextEntry1] : 46 year old male with GERD and obesity who presents for epigastric burning. \par \par #epigastric pain \par #GERD\par - 10 year hx of epigastric pain with EGD 2-3 years ago that was normal\par - will evaluate with EGD for Barretts given daily sxms since last EGD and no records of previous EGD \par \par #colon cancer screening \par - patient with no FH of colon \par - patient with no previous colonoscopy \par \par Recommendations: \par - colonoscopy and EGD for assessment for epigastric pain and screening \par - discussed risk with colonoscopy and EGD, including infection, bleeding and perforation \par - CLD day prior to procedure \par - COVID within 3 d prior to procedure \par - Golytely prep \par - continue with PPI daily \par \par Breanne Terrazas, PGY4\par  GI Fellow

## 2022-09-21 DIAGNOSIS — Z12.11 ENCOUNTER FOR SCREENING FOR MALIGNANT NEOPLASM OF COLON: ICD-10-CM

## 2022-09-21 DIAGNOSIS — E66.9 OBESITY, UNSPECIFIED: ICD-10-CM

## 2022-09-21 DIAGNOSIS — R10.13 EPIGASTRIC PAIN: ICD-10-CM

## 2022-09-21 DIAGNOSIS — K21.9 GASTRO-ESOPHAGEAL REFLUX DISEASE WITHOUT ESOPHAGITIS: ICD-10-CM

## 2022-09-22 ENCOUNTER — APPOINTMENT (OUTPATIENT)
Dept: FAMILY MEDICINE | Facility: HOSPITAL | Age: 47
End: 2022-09-22

## 2022-10-27 ENCOUNTER — OUTPATIENT (OUTPATIENT)
Dept: OUTPATIENT SERVICES | Facility: HOSPITAL | Age: 47
LOS: 1 days | End: 2022-10-27
Payer: SELF-PAY

## 2022-10-27 ENCOUNTER — TRANSCRIPTION ENCOUNTER (OUTPATIENT)
Age: 47
End: 2022-10-27

## 2022-10-27 ENCOUNTER — RESULT REVIEW (OUTPATIENT)
Age: 47
End: 2022-10-27

## 2022-10-27 VITALS
HEIGHT: 68 IN | DIASTOLIC BLOOD PRESSURE: 85 MMHG | TEMPERATURE: 98 F | HEART RATE: 66 BPM | SYSTOLIC BLOOD PRESSURE: 126 MMHG | RESPIRATION RATE: 19 BRPM | WEIGHT: 210.1 LBS | OXYGEN SATURATION: 96 %

## 2022-10-27 VITALS
SYSTOLIC BLOOD PRESSURE: 118 MMHG | DIASTOLIC BLOOD PRESSURE: 73 MMHG | HEART RATE: 67 BPM | OXYGEN SATURATION: 97 % | RESPIRATION RATE: 16 BRPM

## 2022-10-27 DIAGNOSIS — Z12.11 ENCOUNTER FOR SCREENING FOR MALIGNANT NEOPLASM OF COLON: ICD-10-CM

## 2022-10-27 PROCEDURE — 88305 TISSUE EXAM BY PATHOLOGIST: CPT | Mod: 26

## 2022-10-27 PROCEDURE — 43239 EGD BIOPSY SINGLE/MULTIPLE: CPT

## 2022-10-27 PROCEDURE — 43239 EGD BIOPSY SINGLE/MULTIPLE: CPT | Mod: GC

## 2022-10-27 PROCEDURE — 45380 COLONOSCOPY AND BIOPSY: CPT | Mod: GC

## 2022-10-27 PROCEDURE — 88305 TISSUE EXAM BY PATHOLOGIST: CPT

## 2022-10-27 PROCEDURE — 45380 COLONOSCOPY AND BIOPSY: CPT | Mod: PT

## 2022-10-27 DEVICE — NET RETRV ROT ROTH 2.5MMX230CM: Type: IMPLANTABLE DEVICE | Status: FUNCTIONAL

## 2022-10-27 RX ORDER — LIDOCAINE HCL 20 MG/ML
4 VIAL (ML) INJECTION ONCE
Refills: 0 | Status: DISCONTINUED | OUTPATIENT
Start: 2022-10-27 | End: 2022-11-10

## 2022-10-27 NOTE — ASU DISCHARGE PLAN (ADULT/PEDIATRIC) - NS MD DC FALL RISK RISK
For information on Fall & Injury Prevention, visit: [email or web address] OR  [email or web address] OR  https://www.cdc.gov/steadi/patient.html

## 2022-10-27 NOTE — PRE PROCEDURE NOTE - PRE PROCEDURE EVALUATION
Attending Physician:  Dr. Marie                          Procedure: EGD/colon    Indication for Procedure: abdominal pain, CRC screening  ________________________________________________________  PAST MEDICAL & SURGICAL HISTORY:  Dyslipidemia      No significant past surgical history        ALLERGIES:  No Known Allergies    HOME MEDICATIONS:  acetaminophen 325 mg oral tablet: 2 tab(s) orally every 6 hours, As needed, Temp greater or equal to 38C (100.4F), Mild Pain (1 - 3)  albuterol 90 mcg/inh inhalation aerosol: 2 puff(s) inhaled every 6 hours, As Needed for shortness of breath or wheezing    AICD/PPM: [ ] yes   [x] no    PERTINENT LAB DATA:                      PHYSICAL EXAMINATION:    T(C): --  HR: --  BP: --  RR: --  SpO2: --    Constitutional: NAD    Respiratory: CTAB/L  Cardiovascular: S1 and S2  Gastrointestinal: BS+, soft, NT/ND  Extremities: No peripheral edema  Neurological: A/O x 3, no focal deficits        COMMENTS:    The patient is a suitable candidate for the planned procedure unless box checked [ ]  No, explain:

## 2022-10-27 NOTE — PRE-ANESTHESIA EVALUATION ADULT - TEMPERATURE IN CELSIUS (DEGREES C)
Progress Note - Cardiology   Leonor Wagner 45 y o  male MRN: [id number]  Unit/Bed#: ICU 06 Encounter: [id number]      Assessment/Recommendations/Discussion:   1  Large pericardial effusion s/p 500 cc removal of serosanguinous fluid 7/31/19  2  Acute pericarditis--likely secondary to h/o chest radiation +/- lung malignancy  3  Recent acute PE--on high dose apixaban now  4  Small cell lung CA    · Follow pericardial drain--d/c when OK w/ thoracic surgery  Limited echo after removal of pericardial drain to re-establish baseline and repeat in 1 week, then 2 weeks, then 1 month  Increased risk of recurrence since did have pericardial adhesions  · Continue ibuprofen and colchicine  · D/C IV heparin drip before restarting Eliquis  · Tele OK        Subjective: Pt seen/examined   CP improved but returns severely w/ cough          Physical Exam:  GEN:  NAD  HEENT:  MMM, NCAT, pink conjunctiva, EOMI, nonicteric sclera  CV:  NO JVD/HJR, RR, NO M/R/G, +S1/S2, NO PARASTERNAL HEAVE/THRILL, NO LE EDEMA, NO HEPATIC SYSTOLIC PULSATION, WARM EXTREMITIES  RESP:  CTAB/L  ABD:  SOFT, NT, NO GROSS ORGANOMEGALY        Vitals:   BP 93/67   Pulse 96   Temp 98 1 °F (36 7 °C) (Temporal)   Resp 16   Ht 5' 9" (1 753 m)   Wt 67 2 kg (148 lb 2 4 oz)   SpO2 97%   BMI 21 88 kg/m²   Vitals:    07/31/19 1400 08/01/19 0600   Weight: 67 5 kg (148 lb 13 oz) 67 2 kg (148 lb 2 4 oz)       Intake/Output Summary (Last 24 hours) at 8/1/2019 0924  Last data filed at 8/1/2019 0901  Gross per 24 hour   Intake 3160 84 ml   Output 1340 ml   Net 1820 84 ml       TELEMETRY: SR  Lab Results:  Results from last 7 days   Lab Units 08/01/19  0816   WBC Thousand/uL 9 47   HEMOGLOBIN g/dL 11 0*   HEMATOCRIT % 33 1*   PLATELETS Thousands/uL 470*     Results from last 7 days   Lab Units 08/01/19  0816   POTASSIUM mmol/L 4 0   CHLORIDE mmol/L 103   CO2 mmol/L 25   BUN mg/dL 17   CREATININE mg/dL 0 82   CALCIUM mg/dL 8 7     Results from last 7 days   Lab Units 08/01/19  0816   POTASSIUM mmol/L 4 0   CHLORIDE mmol/L 103   CO2 mmol/L 25   BUN mg/dL 17   CREATININE mg/dL 0 82   CALCIUM mg/dL 8 7           Medications:    Current Facility-Administered Medications:     acetaminophen (TYLENOL) tablet 650 mg, 650 mg, Oral, Q6H PRN, Kang Gómez PA-C    apixaban (ELIQUIS) tablet 10 mg, 10 mg, Oral, BID, Kang Gómez PA-C    colchicine (COLCRYS) tablet 0 6 mg, 0 6 mg, Oral, BID, Kang Gómez PA-C, 0 6 mg at 08/01/19 9342    docusate sodium (COLACE) capsule 100 mg, 100 mg, Oral, BID, Yoon Ambron, DO, 100 mg at 08/01/19 0916    fluticasone (FLONASE) 50 mcg/act nasal spray 1 spray, 1 spray, Each Nare, Daily, Yoon Ambron, DO, 1 spray at 08/01/19 0917    guaiFENesin (MUCINEX) 12 hr tablet 600 mg, 600 mg, Oral, Q12H STACY, Yoon Ambron, DO, 600 mg at 08/01/19 0916    guaiFENesin (ROBITUSSIN) oral solution 200 mg, 200 mg, Oral, Q4H PRN, Yoon Ambron, DO    heparin (porcine) 25,000 units in 250 mL infusion (premix), 3-20 Units/kg/hr (Order-Specific), Intravenous, Titrated, Kang Gómez PA-C, Last Rate: 12 mL/hr at 08/01/19 0602, 20 Units/kg/hr at 08/01/19 0602    HYDROmorphone (DILAUDID) injection 0 5 mg, 0 5 mg, Intravenous, Q1H PRN, Kang Gómez PA-C, 0 5 mg at 08/01/19 0455    ibuprofen (MOTRIN) tablet 600 mg, 600 mg, Oral, Q8H [facility], Kang Gómez PA-C, 600 mg at 08/01/19 0500    methocarbamol (ROBAXIN) tablet 750 mg, 750 mg, Oral, Q6H PRN, Kang Gómez PA-C    metoprolol (LOPRESSOR) injection 5 mg, 5 mg, Intravenous, Q6H PRN, Kang Sellar, PA-C, 5 mg at 07/30/19 1829    morphine injection 2 mg, 2 mg, Intravenous, Q4H PRN, [place] Sellar, PA-C, 2 mg at 07/30/19 2304    ondansetron (ZOFRAN) injection 4 mg, 4 mg, Intravenous, Q6H PRN, Kang Ferrisar, PA-C    oxyCODONE (ROXICODONE) IR tablet 5 mg, 5 mg, Oral, Q4H PRN, Kang Gómez, PA-C, 5 mg at 08/01/19 0417    pantoprazole (PROTONIX) EC tablet 40 mg, 40 mg, Oral, Early Morning, Kang Sellar, PA-C, 40 mg at 08/01/19 0500    polyethylene glycol (MIRALAX) packet 17 g, 17 g, Oral, Daily PRN, Yoon Ambron, DO    sodium chloride (OCEAN) 0 65 % nasal spray 1 spray, 1 spray, Each Nare, Q1H PRN, Serene Loose, DO    This note was completed in part utilizing M-Modal Fluency Direct Software  Grammatical errors, random word insertions, spelling mistakes, and incomplete sentences may be an occasional consequence of this system secondary to software limitations, ambient noise, and hardware issues  If you have any questions or concerns about the content, text, or information contained within the body of this dictation, please contact the provider for clarification  36.7

## 2022-10-27 NOTE — PRE-ANESTHESIA EVALUATION ADULT - HEIGHT IN INCHES
Procedures    INFLUENZA, QUADV, 6 MO AND OLDER, IM, PF, PREFILL SYR OR SDV, 0.5ML (FLULAVAL QUADV, PF)       Follow up in 2 weeks. If not improved, consider gallbladder workup.             Electronically signed by Amber Goldberg MD on 12/3/2018 at 2:59 PM 8

## 2022-10-31 LAB — SURGICAL PATHOLOGY STUDY: SIGNIFICANT CHANGE UP

## 2022-11-02 DIAGNOSIS — K64.8 OTHER HEMORRHOIDS: ICD-10-CM

## 2023-03-23 NOTE — ED ADULT TRIAGE NOTE - HEIGHT IN FEET
[Fever] : no fever [Chills] : no chills [Feeling Tired] : not feeling tired [Recent Weight Loss (___ Lbs)] : no recent weight loss [Abdominal Pain] : no abdominal pain [Vomiting] : no vomiting [Constipation] : no constipation [Diarrhea] : no diarrhea [Heartburn] : no heartburn [Negative] : Respiratory 5

## 2023-08-10 ENCOUNTER — OUTPATIENT (OUTPATIENT)
Dept: OUTPATIENT SERVICES | Facility: HOSPITAL | Age: 48
LOS: 1 days | End: 2023-08-10
Payer: SELF-PAY

## 2023-08-10 ENCOUNTER — APPOINTMENT (OUTPATIENT)
Dept: FAMILY MEDICINE | Facility: HOSPITAL | Age: 48
End: 2023-08-10

## 2023-08-10 VITALS
HEIGHT: 68 IN | OXYGEN SATURATION: 97 % | WEIGHT: 210 LBS | HEART RATE: 68 BPM | DIASTOLIC BLOOD PRESSURE: 93 MMHG | BODY MASS INDEX: 31.83 KG/M2 | RESPIRATION RATE: 14 BRPM | SYSTOLIC BLOOD PRESSURE: 131 MMHG | TEMPERATURE: 98 F

## 2023-08-10 DIAGNOSIS — Z78.9 OTHER SPECIFIED HEALTH STATUS: ICD-10-CM

## 2023-08-10 DIAGNOSIS — Z00.00 ENCOUNTER FOR GENERAL ADULT MEDICAL EXAMINATION W/OUT ABNORMAL FINDINGS: ICD-10-CM

## 2023-08-10 DIAGNOSIS — Z00.00 ENCOUNTER FOR GENERAL ADULT MEDICAL EXAMINATION WITHOUT ABNORMAL FINDINGS: ICD-10-CM

## 2023-08-10 DIAGNOSIS — K21.9 GASTRO-ESOPHAGEAL REFLUX DISEASE W/OUT ESOPHAGITIS: ICD-10-CM

## 2023-08-10 DIAGNOSIS — K21.9 GASTRO-ESOPHAGEAL REFLUX DISEASE WITHOUT ESOPHAGITIS: ICD-10-CM

## 2023-08-10 DIAGNOSIS — R73.03 PREDIABETES: ICD-10-CM

## 2023-08-10 DIAGNOSIS — E66.9 OBESITY, UNSPECIFIED: ICD-10-CM

## 2023-08-10 PROCEDURE — 83036 HEMOGLOBIN GLYCOSYLATED A1C: CPT

## 2023-08-10 PROCEDURE — G0463: CPT

## 2023-08-10 PROCEDURE — 84443 ASSAY THYROID STIM HORMONE: CPT

## 2023-08-10 PROCEDURE — 80061 LIPID PANEL: CPT

## 2023-08-10 PROCEDURE — 85027 COMPLETE CBC AUTOMATED: CPT

## 2023-08-10 PROCEDURE — 82607 VITAMIN B-12: CPT

## 2023-08-10 PROCEDURE — 80053 COMPREHEN METABOLIC PANEL: CPT

## 2023-08-10 RX ORDER — PSYLLIUM SEED
48.57 PACKET (EA) ORAL
Qty: 1 | Refills: 2 | Status: COMPLETED | COMMUNITY
Start: 2022-11-02 | End: 2023-08-10

## 2023-08-10 RX ORDER — POLYETHYLENE GLYCOL 3350 AND ELECTROLYTES WITH LEMON FLAVOR 236; 22.74; 6.74; 5.86; 2.97 G/4L; G/4L; G/4L; G/4L; G/4L
236 POWDER, FOR SOLUTION ORAL
Qty: 1 | Refills: 0 | Status: COMPLETED | COMMUNITY
Start: 2022-09-20 | End: 2023-08-10

## 2023-08-10 RX ORDER — PANTOPRAZOLE 40 MG/1
40 TABLET, DELAYED RELEASE ORAL
Qty: 60 | Refills: 0 | Status: COMPLETED | COMMUNITY
Start: 2021-12-04 | End: 2023-08-10

## 2023-08-10 NOTE — REVIEW OF SYSTEMS
[Negative] : Neurological [FreeTextEntry9] : + tingling in upper and lower extremities at night which is positional

## 2023-08-10 NOTE — PHYSICAL EXAM
[Normal] : normal rate, regular rhythm, normal S1 and S2 and no murmur heard [No Varicosities] : no varicosities [Pedal Pulses Present] : the pedal pulses are present [No Edema] : there was no peripheral edema [Soft] : abdomen soft [Non Tender] : non-tender [Non-distended] : non-distended [No CVA Tenderness] : no CVA  tenderness [No Joint Swelling] : no joint swelling [No Rash] : no rash

## 2023-08-10 NOTE — HEALTH RISK ASSESSMENT
[Good] : ~his/her~  mood as  good [Yes] : Yes [2 - 4 times a month (2 pts)] : 2-4 times a month (2 points) [5 or 6 (2 pts)] : 5 or 6 (2  points) [Monthly (2 pts)] : Monthly (2 points) [No falls in past year] : Patient reported no falls in the past year [0] : 2) Feeling down, depressed, or hopeless: Not at all (0) [PHQ-2 Negative - No further assessment needed] : PHQ-2 Negative - No further assessment needed [Patient reported colonoscopy was normal] : Patient reported colonoscopy was normal [None] : None [With Family] : lives with family [Employed] : employed [High School] : high school [Never] : Never [Audit-CScore] : 6 [de-identified] : Weight training and running [Change in mental status noted] : No change in mental status noted [Reports changes in hearing] : Reports no changes in hearing [Language] : denies difficulty with language [Reports changes in dental health] : Reports no changes in dental health [Reports changes in vision] : Reports no changes in vision [ColonoscopyComments] : repeat in 10 years

## 2023-08-10 NOTE — HISTORY OF PRESENT ILLNESS
[FreeTextEntry1] : CPE [de-identified] : 47 year old female with PMH of prediabetes and GERD presents for CPE. Pt states he has been having pins and needles in both of his hands only when he sleeps. Pt explains that this occurs after sleeping on one side for an extended period of time. Pt states this has been going on for 4 months. Pt reports he drinks about 6 beers all at once 4 times a month. Pt also complains of some intermittent mild abdominal pain. Pt was seen by GI and colonoscopy was done which showed mild gastritis and hyperplastic poly in the sigmoid colon.

## 2023-08-10 NOTE — COUNSELING
[Hazards of at-risk alcohol use discussed] : Hazards of at-risk alcohol use discussed [Strategies to reduce or eliminate alcohol use discussed] : Strategies to reduce or eliminate alcohol use discussed [None] : None [Good understanding] : Patient has a good understanding of lifestyle changes and steps needed to achieve self management goal

## 2023-08-20 LAB
ALBUMIN SERPL ELPH-MCNC: 4.7 G/DL
ALP BLD-CCNC: 55 U/L
ALT SERPL-CCNC: 26 U/L
ANION GAP SERPL CALC-SCNC: 11 MMOL/L
AST SERPL-CCNC: 26 U/L
BILIRUB SERPL-MCNC: 0.9 MG/DL
BUN SERPL-MCNC: 10 MG/DL
CALCIUM SERPL-MCNC: 9.7 MG/DL
CHLORIDE SERPL-SCNC: 104 MMOL/L
CHOLEST SERPL-MCNC: 215 MG/DL
CO2 SERPL-SCNC: 23 MMOL/L
CREAT SERPL-MCNC: 0.91 MG/DL
EGFR: 104 ML/MIN/1.73M2
ESTIMATED AVERAGE GLUCOSE: 123 MG/DL
FOLATE SERPL-MCNC: 19.1 NG/ML
GLUCOSE SERPL-MCNC: 113 MG/DL
HBA1C MFR BLD HPLC: 5.9 %
HCT VFR BLD CALC: 52.5 %
HDLC SERPL-MCNC: 45 MG/DL
HGB BLD-MCNC: 17 G/DL
LDLC SERPL CALC-MCNC: 136 MG/DL
MCHC RBC-ENTMCNC: 29.9 PG
MCHC RBC-ENTMCNC: 32.4 GM/DL
MCV RBC AUTO: 92.3 FL
NONHDLC SERPL-MCNC: 170 MG/DL
PLATELET # BLD AUTO: 199 K/UL
POTASSIUM SERPL-SCNC: 4.4 MMOL/L
PROT SERPL-MCNC: 7 G/DL
RBC # BLD: 5.69 M/UL
RBC # FLD: 13.7 %
SODIUM SERPL-SCNC: 139 MMOL/L
TRIGL SERPL-MCNC: 189 MG/DL
TSH SERPL-ACNC: 1.67 UIU/ML
VIT B12 SERPL-MCNC: 421 PG/ML
WBC # FLD AUTO: 5.4 K/UL

## 2023-09-20 ENCOUNTER — OUTPATIENT (OUTPATIENT)
Dept: OUTPATIENT SERVICES | Facility: HOSPITAL | Age: 48
LOS: 1 days | End: 2023-09-20
Payer: SELF-PAY

## 2023-09-20 ENCOUNTER — APPOINTMENT (OUTPATIENT)
Dept: FAMILY MEDICINE | Facility: HOSPITAL | Age: 48
End: 2023-09-20

## 2023-09-20 VITALS
OXYGEN SATURATION: 98 % | WEIGHT: 216 LBS | TEMPERATURE: 98.6 F | RESPIRATION RATE: 14 BRPM | BODY MASS INDEX: 32.74 KG/M2 | HEART RATE: 75 BPM | DIASTOLIC BLOOD PRESSURE: 87 MMHG | SYSTOLIC BLOOD PRESSURE: 134 MMHG | HEIGHT: 68 IN

## 2023-09-20 DIAGNOSIS — R03.0 ELEVATED BLOOD-PRESSURE READING, W/OUT DIAGNOSIS OF HYPERTENSION: ICD-10-CM

## 2023-09-20 DIAGNOSIS — E66.9 OBESITY, UNSPECIFIED: ICD-10-CM

## 2023-09-20 DIAGNOSIS — Z00.00 ENCOUNTER FOR GENERAL ADULT MEDICAL EXAMINATION WITHOUT ABNORMAL FINDINGS: ICD-10-CM

## 2023-09-20 DIAGNOSIS — R73.03 PREDIABETES: ICD-10-CM

## 2023-09-20 DIAGNOSIS — R73.03 PREDIABETES.: ICD-10-CM

## 2023-09-20 PROCEDURE — G0463: CPT

## 2024-01-03 ENCOUNTER — APPOINTMENT (OUTPATIENT)
Dept: FAMILY MEDICINE | Facility: HOSPITAL | Age: 49
End: 2024-01-03

## 2024-01-03 ENCOUNTER — OUTPATIENT (OUTPATIENT)
Dept: OUTPATIENT SERVICES | Facility: HOSPITAL | Age: 49
LOS: 1 days | End: 2024-01-03
Payer: SELF-PAY

## 2024-01-03 VITALS
HEIGHT: 68 IN | TEMPERATURE: 98.7 F | OXYGEN SATURATION: 99 % | SYSTOLIC BLOOD PRESSURE: 120 MMHG | BODY MASS INDEX: 32.58 KG/M2 | RESPIRATION RATE: 14 BRPM | HEART RATE: 74 BPM | WEIGHT: 215 LBS | DIASTOLIC BLOOD PRESSURE: 78 MMHG

## 2024-01-03 DIAGNOSIS — R10.9 UNSPECIFIED ABDOMINAL PAIN: ICD-10-CM

## 2024-01-03 DIAGNOSIS — Z00.00 ENCOUNTER FOR GENERAL ADULT MEDICAL EXAMINATION WITHOUT ABNORMAL FINDINGS: ICD-10-CM

## 2024-01-03 DIAGNOSIS — J02.9 ACUTE PHARYNGITIS, UNSPECIFIED: ICD-10-CM

## 2024-01-03 PROCEDURE — G0463: CPT

## 2024-01-04 DIAGNOSIS — J02.9 ACUTE PHARYNGITIS, UNSPECIFIED: ICD-10-CM

## 2024-01-04 NOTE — HISTORY OF PRESENT ILLNESS
[FreeTextEntry8] : 48 M with PMH of GERD, Hemorrhoids, obesity, Pre-Diabetes presenting for sore throat for the past 2 days. Denied fever, chills, nausea or vomiting. Denied cough, SOB, CASTELLANOS. Pt reporting that no one is sick at home and no one is sick at work. He does sound congested. ROS otherwise negative.

## 2024-01-04 NOTE — ASSESSMENT
[FreeTextEntry1] : Sore throat  - Likely viral  - Can take cepacol lozenges - Gurgle with warm water  - Ordered flu swab   Abdominal pain  - LLQ region - Long standing  - Colonoscopy showed diverticulosis and a hyperplastic polyp  - Monitor and follow up in a month  Case d/w Dr. Slaughter

## 2024-01-04 NOTE — PHYSICAL EXAM
[Well Nourished] : well nourished [Normal Oropharynx] : the oropharynx was normal [No Lymphadenopathy] : no lymphadenopathy [No Respiratory Distress] : no respiratory distress  [No Accessory Muscle Use] : no accessory muscle use [Clear to Auscultation] : lungs were clear to auscultation bilaterally [Soft] : abdomen soft [Non Tender] : non-tender [Non-distended] : non-distended [Normal Supraclavicular Nodes] : no supraclavicular lymphadenopathy [Normal Posterior Cervical Nodes] : no posterior cervical lymphadenopathy [Normal Anterior Cervical Nodes] : no anterior cervical lymphadenopathy [No Rash] : no rash [de-identified] : No pharyngeal erythema or discharge

## 2024-01-04 NOTE — REVIEW OF SYSTEMS
[Sore Throat] : sore throat [Negative] : Neurological [Abdominal Pain] : abdominal pain [Shortness Of Breath] : no shortness of breath [Wheezing] : no wheezing [Cough] : no cough

## 2024-01-05 ENCOUNTER — NON-APPOINTMENT (OUTPATIENT)
Age: 49
End: 2024-01-05

## 2024-01-15 ENCOUNTER — EMERGENCY (EMERGENCY)
Facility: HOSPITAL | Age: 49
LOS: 1 days | Discharge: HOME | End: 2024-01-15
Attending: EMERGENCY MEDICINE | Admitting: EMERGENCY MEDICINE
Payer: MEDICAID

## 2024-01-15 VITALS
HEIGHT: 66 IN | TEMPERATURE: 98 F | OXYGEN SATURATION: 97 % | WEIGHT: 210.1 LBS | DIASTOLIC BLOOD PRESSURE: 97 MMHG | RESPIRATION RATE: 19 BRPM | SYSTOLIC BLOOD PRESSURE: 146 MMHG | HEART RATE: 88 BPM

## 2024-01-15 VITALS
HEART RATE: 90 BPM | OXYGEN SATURATION: 98 % | RESPIRATION RATE: 18 BRPM | SYSTOLIC BLOOD PRESSURE: 139 MMHG | DIASTOLIC BLOOD PRESSURE: 89 MMHG

## 2024-01-15 PROCEDURE — 99283 EMERGENCY DEPT VISIT LOW MDM: CPT

## 2024-01-15 PROCEDURE — 99284 EMERGENCY DEPT VISIT MOD MDM: CPT

## 2024-01-15 RX ORDER — ACETAMINOPHEN 500 MG
650 TABLET ORAL ONCE
Refills: 0 | Status: COMPLETED | OUTPATIENT
Start: 2024-01-15 | End: 2024-01-15

## 2024-01-15 RX ORDER — LIDOCAINE 4 G/100G
1 CREAM TOPICAL ONCE
Refills: 0 | Status: COMPLETED | OUTPATIENT
Start: 2024-01-15 | End: 2024-01-15

## 2024-01-15 RX ORDER — IBUPROFEN 200 MG
600 TABLET ORAL ONCE
Refills: 0 | Status: COMPLETED | OUTPATIENT
Start: 2024-01-15 | End: 2024-01-15

## 2024-01-15 RX ADMIN — Medication 650 MILLIGRAM(S): at 13:45

## 2024-01-15 RX ADMIN — Medication 600 MILLIGRAM(S): at 13:45

## 2024-01-15 RX ADMIN — LIDOCAINE 1 PATCH: 4 CREAM TOPICAL at 13:45

## 2024-01-15 RX ADMIN — Medication 60 MILLIGRAM(S): at 13:45

## 2024-01-15 NOTE — ED PROVIDER NOTE - PROVIDER TOKENS
PROVIDER:[TOKEN:[[id number]],FOLLOWUP:[1-3 Days]] PROVIDER:[TOKEN:[[id number]],FOLLOWUP:[1-3 Days]] PROVIDER:[TOKEN:[[id number]],FOLLOWUP:[1-3 Days]]

## 2024-01-15 NOTE — ED PROVIDER NOTE - PATIENT PORTAL LINK FT
You can access the FollowMyHealth Patient Portal offered by [facility] by registering at the following website: [email or web address]. By joining [employer]’s FollowMyHealth portal, you will also be able to view your health information using other applications (apps) compatible with our system. You can access the FollowMyHealth Patient Portal offered by [facility] by registering at the following website: [email or web address]. By joining [employer]’s FollowMyHealth portal, you will also be able to view your health information using other applications (apps) compatible with our system. You can access the FollowMyHealth Patient Portal offered by [facility] by registering at the following website: [email or web address]. By joining [employer]’s FollowMyHealth portal, you will also be able to view your health information using other applications (apps) compatible with our system.

## 2024-01-15 NOTE — ED ADULT NURSE NOTE - OBJECTIVE STATEMENT
Patient A&Ox4. Came in from home c/o Left lower back pain that radiates down his leg x2 days. Denies any trauma/ heavy lifting. Denies CP/ SOB/ Dizziness/ N/V.

## 2024-01-15 NOTE — ED ADULT NURSE NOTE - COVID-19 ORDERING FACILITY
[facility]  - [facility]  - [facility]  - [facility]

## 2024-01-15 NOTE — ED PROVIDER NOTE - OBJECTIVE STATEMENT
Left lower back pain c radiation to knee x 2 days.  No trauma or fall.  pt has not taking any pain meds.  No other complaints.

## 2024-01-15 NOTE — ED PROVIDER NOTE - COVID-19 ORDERING FACILITY
[facility]  - [facility]  - [facility]  - [facility]

## 2024-01-15 NOTE — ED PROVIDER NOTE - CARE PROVIDER_API CALL
Morris Morris.  Orthopaedic Surgery  [address], [address]  [place] [address]  Phone: [phone]  Fax: [phone]  Follow Up Time: 1-3 Days   Morris Morris.  Orthopaedic Surgery  [address], [address]  [place] [address]  Phone: [phone]  Fax: [phone]  Follow Up Time: 1-3 Days   Morris Morris.  Orthopaedic Surgery  [address], [address]  [place] [address]  Phone: [phone]  Fax: [phone]  Follow Up Time: 1-3 Days

## 2024-01-15 NOTE — ED PROVIDER NOTE - CARE PROVIDERS DIRECT ADDRESSES
[email or web address]

## 2024-01-15 NOTE — ED PROVIDER NOTE - NSFOLLOWUPINSTRUCTIONS_ED_ALL_ED_FT
1.  Take Motrin 400mg every 6 hours for 5 days with meal.  2.  Take Tylenol 650mg every 6 hours for 5 days.  3.  Apply Lidocaine patch to the affected area as prescribed over the counter for 5 days.  4.  Take prednisone as prescribed.

## 2024-01-15 NOTE — ED ADULT NURSE NOTE - NSFALLUNIVINTERV_ED_ALL_ED
Bed/Stretcher in lowest position, wheels locked, appropriate side rails in place/Call bell, personal items and telephone in reach/Instruct patient to call for assistance before getting out of bed/chair/stretcher/Non-slip footwear applied when patient is off stretcher/[place] to call system/Physically safe environment - no spills, clutter or unnecessary equipment/Purposeful proactive rounding/Room/bathroom lighting operational, light cord in reach Bed/Stretcher in lowest position, wheels locked, appropriate side rails in place/Call bell, personal items and telephone in reach/Instruct patient to call for assistance before getting out of bed/chair/stretcher/Non-slip footwear applied when patient is off stretcher/[place] to call system/Physically safe environment - no spills, clutter or unnecessary equipment/Purposeful proactive rounding/Room/bathroom lighting operational, light cord in reach Bed/Stretcher in lowest position, wheels locked, appropriate side rails in place/Call bell, personal items and telephone in reach/Instruct patient to call for assistance before getting out of bed/chair/stretcher/Non-slip footwear applied when patient is off stretcher/[place] to call system/Physically safe environment - no spills, clutter or unnecessary equipment/Purposeful proactive rounding/Room/bathroom lighting operational, light cord in reach

## 2024-01-24 ENCOUNTER — EMERGENCY (EMERGENCY)
Facility: HOSPITAL | Age: 49
LOS: 1 days | Discharge: HOME | End: 2024-01-24
Attending: EMERGENCY MEDICINE | Admitting: EMERGENCY MEDICINE
Payer: MEDICAID

## 2024-01-24 VITALS
DIASTOLIC BLOOD PRESSURE: 68 MMHG | WEIGHT: 210.1 LBS | RESPIRATION RATE: 18 BRPM | HEART RATE: 74 BPM | SYSTOLIC BLOOD PRESSURE: 114 MMHG | OXYGEN SATURATION: 96 % | TEMPERATURE: 98 F | HEIGHT: 66 IN

## 2024-01-24 PROCEDURE — 99284 EMERGENCY DEPT VISIT MOD MDM: CPT

## 2024-01-24 PROCEDURE — 96372 THER/PROPH/DIAG INJ SC/IM: CPT

## 2024-01-24 RX ORDER — MORPHINE SULFATE 50 MG/1
4 CAPSULE, EXTENDED RELEASE ORAL ONCE
Refills: 0 | Status: DISCONTINUED | OUTPATIENT
Start: 2024-01-24 | End: 2024-01-24

## 2024-01-24 RX ORDER — METHOCARBAMOL 500 MG/1
2 TABLET, FILM COATED ORAL
Qty: 40 | Refills: 0
Start: 2024-01-24

## 2024-01-24 RX ORDER — IBUPROFEN 200 MG
1 TABLET ORAL
Qty: 30 | Refills: 0
Start: 2024-01-24

## 2024-01-24 RX ORDER — KETOROLAC TROMETHAMINE 30 MG/ML
30 SYRINGE (ML) INJECTION ONCE
Refills: 0 | Status: DISCONTINUED | OUTPATIENT
Start: 2024-01-24 | End: 2024-01-24

## 2024-01-24 RX ADMIN — Medication 30 MILLIGRAM(S): at 06:40

## 2024-01-24 RX ADMIN — MORPHINE SULFATE 4 MILLIGRAM(S): 50 CAPSULE, EXTENDED RELEASE ORAL at 06:41

## 2024-01-24 NOTE — ED PROVIDER NOTE - OBJECTIVE STATEMENT
48-year-old male with no significant medical history complaining of left low back/buttock area pain rating down the entire leg for last 2 weeks.  No recalled trauma injury or heavy lifting.  Pain started gradually.  No leg weakness numbness.  No bowel or bladder incontinence.  No hematuria or dysuria.  No fever or chills.  No abdominal pain.  Patient was seen here about a week ago and was prescribed 5 days of prednisone, states it did not help.  Took some Tylenol earlier tonight without relief

## 2024-01-24 NOTE — ED PROVIDER NOTE - CARE PROVIDER_API CALL
Benjy Glass  Orthopaedic Surgery  [address] [place] [address], [facility] [address]  [place] [address]  Phone: [phone]  Fax: [phone]  Follow Up Time: 1-3 Days

## 2024-01-24 NOTE — ED PROVIDER NOTE - NSFOLLOWUPINSTRUCTIONS_ED_ALL_ED_FT
-Take Motrin 400 to 600 mg every 6 hours as needed for fever   -In addition to Motrin, you can also take Tylenol 1000 mg every 6 hours as needed for fever   - take methocarbamol as directed for muscle spasm  - follow up with orthopedist this week      Sciatica    WHAT YOU NEED TO KNOW:    Sciatica is a condition that causes pain along your sciatic nerve. The sciatic nerve runs from your spine through both sides of your buttocks. It then runs down the back of your thigh, into your lower leg and foot. Your sciatic nerve may be compressed, inflamed, irritated, or stretched.    DISCHARGE INSTRUCTIONS:    Medicines:   •NSAIDs: These medicines decrease swelling and pain. NSAIDs are available without a doctor's order. Ask your healthcare provider which medicine is right for you. Ask how much to take and when to take it. Take as directed. NSAIDs can cause stomach bleeding or kidney problems if not taken correctly.      •Acetaminophen: This medicine decreases pain. Acetaminophen is available without a doctor's order. Ask how much to take and when to take it. Follow directions. Acetaminophen can cause liver damage if not taken correctly.      •Muscle relaxers help decrease pain and muscle spasms.      •Take your medicine as directed. Contact your healthcare provider if you think your medicine is not helping or if you have side effects. Tell him of her if you are allergic to any medicine. Keep a list of the medicines, vitamins, and herbs you take. Include the amounts, and when and why you take them. Bring the list or the pill bottles to follow-up visits. Carry your medicine list with you in case of an emergency.      Follow up with your healthcare provider as directed: Write down your questions so you remember to ask them during your visits.     Manage your symptoms:   •Activity: Decrease your activity. Do not lift heavy objects or twist your back for at least 6 weeks. Slowly return to your usual activity.      •Ice: Ice helps decrease swelling and pain. Ice may also help prevent tissue damage. Use an ice pack, or put crushed ice in a plastic bag. Cover it with a towel and place it on your low back or leg for 15 to 20 minutes every hour or as directed.      •Heat: Heat helps decrease pain and muscle spasms. Apply heat on the area for 20 to 30 minutes every 2 hours for as many days as directed.       •Physical therapy: You may need to see physical therapist to teach you exercises to help improve movement and strength, and to decrease pain. An occupational therapist teaches you skills to help with your daily activities.       •Use assistive devices if directed: You may need to wear back support, such as a back brace. You may need crutches, a cane, or a walker to decrease stress on your lower back and leg muscles. Ask your healthcare provider for more information about assistive devices and how to use them correctly.      Self-care:   •Avoid pressure on your back and legs: Do not lift heavy objects, or stand or sit for long periods of time.      •Lift objects safely: Keep your back straight and bend your knees when you  an object. Do not bend or twist your back when you lift.      •Maintain a healthy weight: Ask your healthcare provider how much you should weigh. Ask him to help you create a weight loss plan if you are overweight.       •Exercise: Ask your healthcare provider about the best stretching, warmup, and exercise plan for you.       Contact your healthcare provider if:   •You have pain in your lower back at night or when resting.      •You have pain in your lower back with numbness below the knee.      •You have weakness in one leg only.      •You have questions or concerns about your condition or care.      Return to the emergency department if:   •You have trouble holding back your urine or bowel movements.      •You have weakness in both legs.      •You have numbness in your groin or buttocks. -Take Motrin 400 to 600 mg every 6 hours as needed for fever   -In addition to Motrin, you can also take Tylenol 1000 mg every 6 hours as needed for fever   - take methocarbamol as directed for muscle spasm  - follow up with orthopedist this week    -[place] Motrin de 400 a 600 mg cada 6 horas según sea necesario para la fiebre.  -Además de Motrin, también puedes destiny Tylenol 1000 mg cada 6 horas según sea necesario para la fiebre.  - destiny metocarbamol según las indicaciones para el espasmo muscular  - seguimiento con el ortopedista esta semana    Sciatica    WHAT YOU NEED TO KNOW:    Sciatica is a condition that causes pain along your sciatic nerve. The sciatic nerve runs from your spine through both sides of your buttocks. It then runs down the back of your thigh, into your lower leg and foot. Your sciatic nerve may be compressed, inflamed, irritated, or stretched.    DISCHARGE INSTRUCTIONS:    Medicines:   •NSAIDs: These medicines decrease swelling and pain. NSAIDs are available without a doctor's order. Ask your healthcare provider which medicine is right for you. Ask how much to take and when to take it. Take as directed. NSAIDs can cause stomach bleeding or kidney problems if not taken correctly.      •Acetaminophen: This medicine decreases pain. Acetaminophen is available without a doctor's order. Ask how much to take and when to take it. Follow directions. Acetaminophen can cause liver damage if not taken correctly.      •Muscle relaxers help decrease pain and muscle spasms.      •Take your medicine as directed. Contact your healthcare provider if you think your medicine is not helping or if you have side effects. Tell him of her if you are allergic to any medicine. Keep a list of the medicines, vitamins, and herbs you take. Include the amounts, and when and why you take them. Bring the list or the pill bottles to follow-up visits. Carry your medicine list with you in case of an emergency.      Follow up with your healthcare provider as directed: Write down your questions so you remember to ask them during your visits.     Manage your symptoms:   •Activity: Decrease your activity. Do not lift heavy objects or twist your back for at least 6 weeks. Slowly return to your usual activity.      •Ice: Ice helps decrease swelling and pain. Ice may also help prevent tissue damage. Use an ice pack, or put crushed ice in a plastic bag. Cover it with a towel and place it on your low back or leg for 15 to 20 minutes every hour or as directed.      •Heat: Heat helps decrease pain and muscle spasms. Apply heat on the area for 20 to 30 minutes every 2 hours for as many days as directed.       •Physical therapy: You may need to see physical therapist to teach you exercises to help improve movement and strength, and to decrease pain. An occupational therapist teaches you skills to help with your daily activities.       •Use assistive devices if directed: You may need to wear back support, such as a back brace. You may need crutches, a cane, or a walker to decrease stress on your lower back and leg muscles. Ask your healthcare provider for more information about assistive devices and how to use them correctly.      Self-care:   •Avoid pressure on your back and legs: Do not lift heavy objects, or stand or sit for long periods of time.      •Lift objects safely: Keep your back straight and bend your knees when you  an object. Do not bend or twist your back when you lift.      •Maintain a healthy weight: Ask your healthcare provider how much you should weigh. Ask him to help you create a weight loss plan if you are overweight.       •Exercise: Ask your healthcare provider about the best stretching, warmup, and exercise plan for you.       Contact your healthcare provider if:   •You have pain in your lower back at night or when resting.      •You have pain in your lower back with numbness below the knee.      •You have weakness in one leg only.      •You have questions or concerns about your condition or care.      Return to the emergency department if:   •You have trouble holding back your urine or bowel movements.      •You have weakness in both legs.      •You have numbness in your groin or buttocks.

## 2024-01-24 NOTE — ED PROVIDER NOTE - PHYSICAL EXAMINATION
exam:   General: NAD.   HEENT: eyes perrl,   cor: RRR, s1s2, 2+rad pulses.   lungs: ctabl, no resp distress.   abd: soft, ntnd.   neuro: a&ox3, cn2-12 intact, CHUNG, 5/5 strength c nl sensation all extremities, nl coordination. EHL/plantarflexion close toes bilaterally normal, 5/5 strength.  Normal distal sensation  MSK: no extremity swelling. no c/t/L spine tenderness.  mild focal L upper buttock ttp. Pain with straight leg raise bilateral lower extremities at 15 degrees  Skin: normal, no rash

## 2024-01-24 NOTE — ED ADULT TRIAGE NOTE - CHIEF COMPLAINT QUOTE
I have left hip to left leg pain for almost 2 weeks now. I was here last week and was told Sciatica. I took Tylenol at 1 AM"

## 2024-01-24 NOTE — ED PROVIDER NOTE - PATIENT PORTAL LINK FT
You can access the FollowMyHealth Patient Portal offered by [facility] by registering at the following website: [email or web address]. By joining [facility]’s FollowMyHealth portal, you will also be able to view your health information using other applications (apps) compatible with our system.

## 2024-01-24 NOTE — ED ADULT NURSE NOTE - NSFALLUNIVINTERV_ED_ALL_ED
Bed/Stretcher in lowest position, wheels locked, appropriate side rails in place/Call bell, personal items and telephone in reach/Instruct patient to call for assistance before getting out of bed/chair/stretcher/Non-slip footwear applied when patient is off stretcher/[place] to call system/Physically safe environment - no spills, clutter or unnecessary equipment/Purposeful proactive rounding/Room/bathroom lighting operational, light cord in reach

## 2024-01-24 NOTE — ED PROVIDER NOTE - CLINICAL SUMMARY MEDICAL DECISION MAKING FREE TEXT BOX
48-year-old male with no significant medical history complaining of left low back/buttock area pain rating down the entire leg for last 2 weeks.  No recalled trauma injury or heavy lifting.  Pain started gradually.  No leg weakness numbness.  No bowel or bladder incontinence.  No hematuria or dysuria.  No fever or chills.  No abdominal pain.  Patient was seen here about a week ago and was prescribed 5 days of prednisone, states it did not help.  Took some Tylenol earlier tonight without relief    Patient appears uncomfortable in pain.  No midline spinal tenderness.  Bilateral lower extremities neuro intact.  Pain with straight leg raise bilateral lower extremities 15 degrees.  Likely sciatica.  No signs of cord compression.  Will give IM Toradol and morphine.  Reassess 48-year-old male with no significant medical history complaining of left low back/buttock area pain rating down the entire leg for last 2 weeks.  No recalled trauma injury or heavy lifting.  Pain started gradually.  No leg weakness numbness.  No bowel or bladder incontinence.  No hematuria or dysuria.  No fever or chills.  No abdominal pain.  Patient was seen here about a week ago and was prescribed 5 days of prednisone, states it did not help.  Took some Tylenol earlier tonight without relief    Patient appears uncomfortable in pain.  No midline spinal tenderness.  Bilateral lower extremities neuro intact.  Pain with straight leg raise bilateral lower extremities 15 degrees.  Likely sciatica.  No signs of cord compression.  Will give IM Toradol and morphine.  Reassess    Pain with some improvement after medications.  Patient ambulatory.  Ibuprofen and Robaxin prescribed.  Follow-up Ortho

## 2024-02-20 NOTE — HISTORY OF PRESENT ILLNESS
[FreeTextEntry1] : f/u elevated cholesterol  [de-identified] : 46 y/o M with PMHx GERD, obesity, preDM, mild gastritis, and hyperplastic polyp sigmoid colon seeing GI now who is here for f/u lab results.   A1c 5.9  ( last A1c 60) CBC, CMP, TSH unremarkable  Lipid panel : mildly elevated cholesterol and triglyceride   Denies fever, chills, headache, chest pain, SOB, abdominal pain, diarrhea, urinary issues or other concerns.

## 2024-03-09 ENCOUNTER — OUTPATIENT (OUTPATIENT)
Dept: OUTPATIENT SERVICES | Facility: HOSPITAL | Age: 49
LOS: 1 days | End: 2024-03-09
Payer: SELF-PAY

## 2024-03-09 ENCOUNTER — APPOINTMENT (OUTPATIENT)
Dept: FAMILY MEDICINE | Facility: HOSPITAL | Age: 49
End: 2024-03-09

## 2024-03-09 ENCOUNTER — MED ADMIN CHARGE (OUTPATIENT)
Age: 49
End: 2024-03-09

## 2024-03-09 VITALS
HEART RATE: 84 BPM | SYSTOLIC BLOOD PRESSURE: 138 MMHG | DIASTOLIC BLOOD PRESSURE: 91 MMHG | TEMPERATURE: 97.6 F | OXYGEN SATURATION: 95 % | WEIGHT: 202 LBS | RESPIRATION RATE: 14 BRPM | HEIGHT: 68 IN | BODY MASS INDEX: 30.62 KG/M2

## 2024-03-09 DIAGNOSIS — Z23 ENCOUNTER FOR IMMUNIZATION: ICD-10-CM

## 2024-03-09 DIAGNOSIS — Z00.00 ENCOUNTER FOR GENERAL ADULT MEDICAL EXAMINATION WITHOUT ABNORMAL FINDINGS: ICD-10-CM

## 2024-03-09 DIAGNOSIS — M54.50 LOW BACK PAIN, UNSPECIFIED: ICD-10-CM

## 2024-03-09 PROCEDURE — G0463: CPT

## 2024-03-09 RX ORDER — PANTOPRAZOLE 20 MG/1
20 TABLET, DELAYED RELEASE ORAL
Qty: 60 | Refills: 0 | Status: COMPLETED | COMMUNITY
Start: 2022-02-07 | End: 2024-03-09

## 2024-03-09 NOTE — PHYSICAL EXAM
[Normal Gait] : normal gait [Normal] : soft, non-tender, non-distended, no masses palpated, no HSM and normal bowel sounds [Normal Affect] : the affect was normal

## 2024-03-18 NOTE — HISTORY OF PRESENT ILLNESS
[FreeTextEntry8] : 48 year old M here for CPE. Patient forgot he had a CPE in August of last year. he is otherwise doing well and in NAD. He takes no medications.

## 2024-05-08 NOTE — ED ADULT NURSE NOTE - EXTENSIONS OF SELF_ADULT
[FreeTextEntry1] : Exam. Xrays, 2 views< AP and Lat, of b/l feet obtained, reviewed and discussed with the patient. Rx diclofenac, instructed patient on use of diclofenac. Applied Elastoplast strapping with LA pad to the patient's left lower extremity. Instructed patient to wear supportive shoe gear and not to walk barefoot. Fabricated and dispensed 2 felt offloading heel pads. Obtained nail biopsy of dystrophic toe nails, hallux b/l toe nails, will review results with patient when available. Aseptic debridement of dystrophic toe nails with sterile nippers and electric fariha to patient's tolerance. Instructed patient to call our office if any problems arise. Patient demonstrated verbal understanding of all instructions.  None

## 2024-09-14 ENCOUNTER — OUTPATIENT (OUTPATIENT)
Dept: OUTPATIENT SERVICES | Facility: HOSPITAL | Age: 49
LOS: 1 days | End: 2024-09-14
Payer: SELF-PAY

## 2024-09-14 ENCOUNTER — MED ADMIN CHARGE (OUTPATIENT)
Age: 49
End: 2024-09-14

## 2024-09-14 ENCOUNTER — APPOINTMENT (OUTPATIENT)
Dept: FAMILY MEDICINE | Facility: HOSPITAL | Age: 49
End: 2024-09-14

## 2024-09-14 VITALS
SYSTOLIC BLOOD PRESSURE: 147 MMHG | HEART RATE: 66 BPM | TEMPERATURE: 97.6 F | OXYGEN SATURATION: 96 % | BODY MASS INDEX: 32.08 KG/M2 | WEIGHT: 211 LBS | RESPIRATION RATE: 14 BRPM | DIASTOLIC BLOOD PRESSURE: 99 MMHG

## 2024-09-14 DIAGNOSIS — Z00.00 ENCOUNTER FOR GENERAL ADULT MEDICAL EXAMINATION WITHOUT ABNORMAL FINDINGS: ICD-10-CM

## 2024-09-14 DIAGNOSIS — R73.03 PREDIABETES.: ICD-10-CM

## 2024-09-14 DIAGNOSIS — R73.03 PREDIABETES: ICD-10-CM

## 2024-09-14 DIAGNOSIS — Z00.00 ENCOUNTER FOR GENERAL ADULT MEDICAL EXAMINATION W/OUT ABNORMAL FINDINGS: ICD-10-CM

## 2024-09-14 DIAGNOSIS — E66.9 OBESITY, UNSPECIFIED: ICD-10-CM

## 2024-09-14 DIAGNOSIS — Z23 ENCOUNTER FOR IMMUNIZATION: ICD-10-CM

## 2024-09-14 NOTE — HISTORY OF PRESENT ILLNESS
[FreeTextEntry1] : physical [de-identified] : 48M h/o GERD, hemorrhoids, obesity, preDM presents for physical. pt feels decreased energy for the past several months. denies weight changes, stool changes, fever, anhedonia, skin changes, weakness, dyspnea.

## 2024-09-14 NOTE — PHYSICAL EXAM
[No Acute Distress] : no acute distress [Well-Appearing] : well-appearing [Normal Sclera/Conjunctiva] : normal sclera/conjunctiva [PERRL] : pupils equal round and reactive to light [EOMI] : extraocular movements intact [Normal Oropharynx] : the oropharynx was normal [No Lymphadenopathy] : no lymphadenopathy [Supple] : supple [Thyroid Normal, No Nodules] : the thyroid was normal and there were no nodules present [No Edema] : there was no peripheral edema [No Extremity Clubbing/Cyanosis] : no extremity clubbing/cyanosis [No Rash] : no rash [Normal Gait] : normal gait [Normal] : affect was normal and insight and judgment were intact

## 2024-09-14 NOTE — HEALTH RISK ASSESSMENT
[Yes] : Yes [2 - 4 times a month (2 pts)] : 2-4 times a month (2 points) [5 or 6 (2 pts)] : 5 or 6 (2  points) [Less than monthly (1 pt)] : Less than monthly (1 point) [0] : 2) Feeling down, depressed, or hopeless: Not at all (0) [Never] : Never [Sexually Active] : sexually active [Fully functional (using the telephone, shopping, preparing meals, housekeeping, doing laundry, using] : Fully functional and needs no help or supervision to perform IADLs (using the telephone, shopping, preparing meals, housekeeping, doing laundry, using transportation, managing medications and managing finances) [Fully functional (bathing, dressing, toileting, transferring, walking, feeding)] : Fully functional (bathing, dressing, toileting, transferring, walking, feeding) [ColonoscopyDate] : 10/2022 [ColonoscopyComments] : repeat in 10y [de-identified] : lives with son and wife [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

## 2024-09-17 PROCEDURE — 83036 HEMOGLOBIN GLYCOSYLATED A1C: CPT

## 2024-09-17 PROCEDURE — 85025 COMPLETE CBC W/AUTO DIFF WBC: CPT

## 2024-09-17 PROCEDURE — 80061 LIPID PANEL: CPT

## 2024-09-17 PROCEDURE — 96372 THER/PROPH/DIAG INJ SC/IM: CPT

## 2024-09-17 PROCEDURE — G0463: CPT

## 2024-09-17 PROCEDURE — 80053 COMPREHEN METABOLIC PANEL: CPT

## 2024-09-17 PROCEDURE — 84443 ASSAY THYROID STIM HORMONE: CPT

## 2024-09-17 PROCEDURE — G0008: CPT

## 2024-09-18 LAB
ALBUMIN SERPL ELPH-MCNC: 4.8 G/DL
ALP BLD-CCNC: 60 U/L
ALT SERPL-CCNC: 29 U/L
ANION GAP SERPL CALC-SCNC: 13 MMOL/L
AST SERPL-CCNC: 22 U/L
BASOPHILS # BLD AUTO: 0.04 K/UL
BASOPHILS NFR BLD AUTO: 0.4 %
BILIRUB SERPL-MCNC: 0.8 MG/DL
BUN SERPL-MCNC: 15 MG/DL
CALCIUM SERPL-MCNC: 9.7 MG/DL
CHLORIDE SERPL-SCNC: 102 MMOL/L
CHOLEST SERPL-MCNC: 231 MG/DL
CO2 SERPL-SCNC: 22 MMOL/L
CREAT SERPL-MCNC: 0.83 MG/DL
EGFR: 107 ML/MIN/1.73M2
EOSINOPHIL # BLD AUTO: 0.02 K/UL
EOSINOPHIL NFR BLD AUTO: 0.2 %
ESTIMATED AVERAGE GLUCOSE: 134 MG/DL
GLUCOSE SERPL-MCNC: 107 MG/DL
HBA1C MFR BLD HPLC: 6.3 %
HCT VFR BLD CALC: 50.8 %
HDLC SERPL-MCNC: 49 MG/DL
HGB BLD-MCNC: 16.3 G/DL
IMM GRANULOCYTES NFR BLD AUTO: 0.3 %
LDLC SERPL CALC-MCNC: 147 MG/DL
LYMPHOCYTES # BLD AUTO: 2.9 K/UL
LYMPHOCYTES NFR BLD AUTO: 26.7 %
MAN DIFF?: NORMAL
MCHC RBC-ENTMCNC: 29.2 PG
MCHC RBC-ENTMCNC: 32.1 GM/DL
MCV RBC AUTO: 91 FL
MONOCYTES # BLD AUTO: 0.75 K/UL
MONOCYTES NFR BLD AUTO: 6.9 %
NEUTROPHILS # BLD AUTO: 7.14 K/UL
NEUTROPHILS NFR BLD AUTO: 65.5 %
NONHDLC SERPL-MCNC: 181 MG/DL
PLATELET # BLD AUTO: 252 K/UL
POTASSIUM SERPL-SCNC: 4.2 MMOL/L
PROT SERPL-MCNC: 7.1 G/DL
RBC # BLD: 5.58 M/UL
RBC # FLD: 14 %
SODIUM SERPL-SCNC: 138 MMOL/L
TRIGL SERPL-MCNC: 190 MG/DL
TSH SERPL-ACNC: 2.07 UIU/ML
WBC # FLD AUTO: 10.88 K/UL

## 2024-11-16 ENCOUNTER — APPOINTMENT (OUTPATIENT)
Dept: FAMILY MEDICINE | Facility: HOSPITAL | Age: 49
End: 2024-11-16

## 2024-11-16 ENCOUNTER — OUTPATIENT (OUTPATIENT)
Dept: OUTPATIENT SERVICES | Facility: HOSPITAL | Age: 49
LOS: 1 days | End: 2024-11-16
Payer: COMMERCIAL

## 2024-11-16 ENCOUNTER — OUTPATIENT (OUTPATIENT)
Dept: OUTPATIENT SERVICES | Facility: HOSPITAL | Age: 49
LOS: 1 days | End: 2024-11-16
Payer: SELF-PAY

## 2024-11-16 VITALS
TEMPERATURE: 97.7 F | RESPIRATION RATE: 14 BRPM | DIASTOLIC BLOOD PRESSURE: 83 MMHG | HEIGHT: 68 IN | SYSTOLIC BLOOD PRESSURE: 123 MMHG | HEART RATE: 77 BPM | BODY MASS INDEX: 31.83 KG/M2 | OXYGEN SATURATION: 98 % | WEIGHT: 210 LBS

## 2024-11-16 DIAGNOSIS — R10.13 EPIGASTRIC PAIN: ICD-10-CM

## 2024-11-16 DIAGNOSIS — Z12.11 ENCOUNTER FOR SCREENING FOR MALIGNANT NEOPLASM OF COLON: ICD-10-CM

## 2024-11-16 DIAGNOSIS — Z00.00 ENCOUNTER FOR GENERAL ADULT MEDICAL EXAMINATION WITHOUT ABNORMAL FINDINGS: ICD-10-CM

## 2024-11-16 PROCEDURE — 82274 ASSAY TEST FOR BLOOD FECAL: CPT

## 2024-11-16 PROCEDURE — G0463: CPT

## 2024-11-16 PROCEDURE — 87338 HPYLORI STOOL AG IA: CPT

## 2024-12-05 ENCOUNTER — APPOINTMENT (OUTPATIENT)
Dept: FAMILY MEDICINE | Facility: HOSPITAL | Age: 49
End: 2024-12-05

## 2024-12-05 ENCOUNTER — OUTPATIENT (OUTPATIENT)
Dept: OUTPATIENT SERVICES | Facility: HOSPITAL | Age: 49
LOS: 1 days | End: 2024-12-05
Payer: SELF-PAY

## 2024-12-05 VITALS
HEART RATE: 75 BPM | OXYGEN SATURATION: 96 % | RESPIRATION RATE: 14 BRPM | WEIGHT: 211 LBS | SYSTOLIC BLOOD PRESSURE: 115 MMHG | DIASTOLIC BLOOD PRESSURE: 80 MMHG | BODY MASS INDEX: 32.08 KG/M2 | TEMPERATURE: 97.7 F

## 2024-12-05 DIAGNOSIS — Z00.00 ENCOUNTER FOR GENERAL ADULT MEDICAL EXAMINATION WITHOUT ABNORMAL FINDINGS: ICD-10-CM

## 2024-12-05 DIAGNOSIS — R10.9 UNSPECIFIED ABDOMINAL PAIN: ICD-10-CM

## 2024-12-05 PROCEDURE — G0463: CPT

## 2024-12-06 ENCOUNTER — OUTPATIENT (OUTPATIENT)
Dept: OUTPATIENT SERVICES | Facility: HOSPITAL | Age: 49
LOS: 1 days | End: 2024-12-06
Payer: COMMERCIAL

## 2024-12-06 ENCOUNTER — RESULT REVIEW (OUTPATIENT)
Age: 49
End: 2024-12-06

## 2024-12-06 DIAGNOSIS — Z00.00 ENCOUNTER FOR GENERAL ADULT MEDICAL EXAMINATION WITHOUT ABNORMAL FINDINGS: ICD-10-CM

## 2024-12-06 DIAGNOSIS — R10.13 EPIGASTRIC PAIN: ICD-10-CM

## 2024-12-06 PROCEDURE — 76700 US EXAM ABDOM COMPLETE: CPT | Mod: 26

## 2024-12-06 PROCEDURE — 76700 US EXAM ABDOM COMPLETE: CPT

## 2024-12-12 ENCOUNTER — NON-APPOINTMENT (OUTPATIENT)
Age: 49
End: 2024-12-12

## 2024-12-21 ENCOUNTER — APPOINTMENT (OUTPATIENT)
Dept: FAMILY MEDICINE | Facility: HOSPITAL | Age: 49
End: 2024-12-21

## 2025-02-15 ENCOUNTER — OUTPATIENT (OUTPATIENT)
Dept: OUTPATIENT SERVICES | Facility: HOSPITAL | Age: 50
LOS: 1 days | End: 2025-02-15
Payer: SELF-PAY

## 2025-02-15 ENCOUNTER — APPOINTMENT (OUTPATIENT)
Age: 50
End: 2025-02-15

## 2025-02-15 VITALS
BODY MASS INDEX: 31.93 KG/M2 | OXYGEN SATURATION: 100 % | TEMPERATURE: 98.3 F | SYSTOLIC BLOOD PRESSURE: 130 MMHG | HEART RATE: 81 BPM | WEIGHT: 210 LBS | RESPIRATION RATE: 16 BRPM | DIASTOLIC BLOOD PRESSURE: 85 MMHG

## 2025-02-15 DIAGNOSIS — Z00.00 ENCOUNTER FOR GENERAL ADULT MEDICAL EXAMINATION WITHOUT ABNORMAL FINDINGS: ICD-10-CM

## 2025-02-15 DIAGNOSIS — Z00.00 ENCOUNTER FOR GENERAL ADULT MEDICAL EXAMINATION W/OUT ABNORMAL FINDINGS: ICD-10-CM

## 2025-02-15 DIAGNOSIS — R73.03 PREDIABETES.: ICD-10-CM

## 2025-02-15 PROCEDURE — G0463: CPT

## 2025-03-17 NOTE — ED ADULT NURSE NOTE - PAIN: PRESENCE, MLM
What Type Of Note Output Would You Prefer (Optional)?: Standard Output Hpi Title: Evaluation of Skin Lesions How Severe Are Your Spot(S)?: mild Have Your Spot(S) Been Treated In The Past?: has not been treated complains of pain/discomfort

## (undated) DEVICE — CLAMP BX HOT RAD JAW 3

## (undated) DEVICE — BITE BLOCK ADULT 20 X 27MM (GREEN)

## (undated) DEVICE — BIOPSY FORCEP RADIAL JAW 4 STANDARD WITH NEEDLE

## (undated) DEVICE — POLY TRAP ETRAP

## (undated) DEVICE — SOL INJ NS 0.9% 500ML 2 PORT

## (undated) DEVICE — SENSOR O2 FINGER ADULT

## (undated) DEVICE — SYR LUER LOK 50CC

## (undated) DEVICE — BALLOON US ENDO

## (undated) DEVICE — IRRIGATOR BIO SHIELD

## (undated) DEVICE — TUBING IV SET GRAVITY 3Y 100" MACRO

## (undated) DEVICE — FOLEY HOLDER STATLOCK 2 WAY ADULT

## (undated) DEVICE — FORCEP RADIAL JAW 4 JUMBO 2.8MM 3.2MM 240CM ORANGE DISP

## (undated) DEVICE — TUBING SUCTION CONN 6FT STERILE

## (undated) DEVICE — PACK IV START WITH CHG

## (undated) DEVICE — ELCTR GROUNDING PAD ADULT COVIDIEN

## (undated) DEVICE — TUBING SUCTION 20FT

## (undated) DEVICE — SUCTION YANKAUER NO CONTROL VENT

## (undated) DEVICE — SYR ALLIANCE II INFLATION 60ML

## (undated) DEVICE — CATH IV SAFE BC 22G X 1" (BLUE)

## (undated) DEVICE — CATH IV SAFE BC 20G X 1.16" (PINK)

## (undated) DEVICE — BRUSH COLONOSCOPY CYTOLOGY